# Patient Record
Sex: FEMALE | Race: BLACK OR AFRICAN AMERICAN | NOT HISPANIC OR LATINO | Employment: FULL TIME | URBAN - METROPOLITAN AREA
[De-identification: names, ages, dates, MRNs, and addresses within clinical notes are randomized per-mention and may not be internally consistent; named-entity substitution may affect disease eponyms.]

---

## 2023-07-01 LAB — SARS-COV-2 RESULT: NOT DETECTED

## 2024-03-16 ENCOUNTER — APPOINTMENT (OUTPATIENT)
Dept: RADIOLOGY | Facility: HOSPITAL | Age: 34
End: 2024-03-16
Payer: COMMERCIAL

## 2024-03-16 ENCOUNTER — HOSPITAL ENCOUNTER (EMERGENCY)
Facility: HOSPITAL | Age: 34
Discharge: HOME | End: 2024-03-16
Attending: STUDENT IN AN ORGANIZED HEALTH CARE EDUCATION/TRAINING PROGRAM
Payer: COMMERCIAL

## 2024-03-16 VITALS
RESPIRATION RATE: 19 BRPM | BODY MASS INDEX: 40.2 KG/M2 | DIASTOLIC BLOOD PRESSURE: 69 MMHG | HEART RATE: 76 BPM | TEMPERATURE: 98.4 F | OXYGEN SATURATION: 98 % | WEIGHT: 226.85 LBS | SYSTOLIC BLOOD PRESSURE: 126 MMHG | HEIGHT: 63 IN

## 2024-03-16 DIAGNOSIS — O46.90 VAGINAL BLEEDING DURING PREGNANCY (HHS-HCC): ICD-10-CM

## 2024-03-16 DIAGNOSIS — N30.01 ACUTE CYSTITIS WITH HEMATURIA: Primary | ICD-10-CM

## 2024-03-16 DIAGNOSIS — Z34.90 INTRAUTERINE PREGNANCY (HHS-HCC): ICD-10-CM

## 2024-03-16 LAB
ABO GROUP (TYPE) IN BLOOD: NORMAL
ANION GAP SERPL CALC-SCNC: 11 MMOL/L
APPEARANCE UR: ABNORMAL
BACTERIA #/AREA URNS AUTO: ABNORMAL /HPF
BASOPHILS # BLD AUTO: 0.02 X10*3/UL (ref 0–0.1)
BASOPHILS NFR BLD AUTO: 0.2 %
BILIRUB UR STRIP.AUTO-MCNC: NEGATIVE MG/DL
BUN SERPL-MCNC: 9 MG/DL (ref 8–25)
CALCIUM SERPL-MCNC: 8.8 MG/DL (ref 8.5–10.4)
CHLORIDE SERPL-SCNC: 102 MMOL/L (ref 97–107)
CO2 SERPL-SCNC: 22 MMOL/L (ref 24–31)
COLOR UR: YELLOW
CREAT SERPL-MCNC: 0.8 MG/DL (ref 0.4–1.6)
EGFRCR SERPLBLD CKD-EPI 2021: >90 ML/MIN/1.73M*2
EOSINOPHIL # BLD AUTO: 0.1 X10*3/UL (ref 0–0.7)
EOSINOPHIL NFR BLD AUTO: 1.2 %
ERYTHROCYTE [DISTWIDTH] IN BLOOD BY AUTOMATED COUNT: 14.7 % (ref 11.5–14.5)
GLUCOSE SERPL-MCNC: 105 MG/DL (ref 65–99)
GLUCOSE UR STRIP.AUTO-MCNC: NORMAL MG/DL
HCG SERPL-ACNC: NORMAL MIU/ML
HCG UR QL IA.RAPID: POSITIVE
HCT VFR BLD AUTO: 36.2 % (ref 36–46)
HGB BLD-MCNC: 11.9 G/DL (ref 12–16)
IMM GRANULOCYTES # BLD AUTO: 0.02 X10*3/UL (ref 0–0.7)
IMM GRANULOCYTES NFR BLD AUTO: 0.2 % (ref 0–0.9)
KETONES UR STRIP.AUTO-MCNC: NEGATIVE MG/DL
LEUKOCYTE ESTERASE UR QL STRIP.AUTO: ABNORMAL
LYMPHOCYTES # BLD AUTO: 2.18 X10*3/UL (ref 1.2–4.8)
LYMPHOCYTES NFR BLD AUTO: 26.5 %
MCH RBC QN AUTO: 26.6 PG (ref 26–34)
MCHC RBC AUTO-ENTMCNC: 32.9 G/DL (ref 32–36)
MCV RBC AUTO: 81 FL (ref 80–100)
MONOCYTES # BLD AUTO: 0.49 X10*3/UL (ref 0.1–1)
MONOCYTES NFR BLD AUTO: 6 %
MUCOUS THREADS #/AREA URNS AUTO: ABNORMAL /LPF
NEUTROPHILS # BLD AUTO: 5.42 X10*3/UL (ref 1.2–7.7)
NEUTROPHILS NFR BLD AUTO: 65.9 %
NITRITE UR QL STRIP.AUTO: NEGATIVE
NRBC BLD-RTO: 0 /100 WBCS (ref 0–0)
PH UR STRIP.AUTO: 6 [PH]
PLATELET # BLD AUTO: 255 X10*3/UL (ref 150–450)
POTASSIUM SERPL-SCNC: 3.8 MMOL/L (ref 3.4–5.1)
PROT UR STRIP.AUTO-MCNC: ABNORMAL MG/DL
RBC # BLD AUTO: 4.47 X10*6/UL (ref 4–5.2)
RBC # UR STRIP.AUTO: ABNORMAL /UL
RBC #/AREA URNS AUTO: >20 /HPF
RH FACTOR (ANTIGEN D): NORMAL
SODIUM SERPL-SCNC: 135 MMOL/L (ref 133–145)
SP GR UR STRIP.AUTO: 1.02
SQUAMOUS #/AREA URNS AUTO: ABNORMAL /HPF
UROBILINOGEN UR STRIP.AUTO-MCNC: NORMAL MG/DL
WBC # BLD AUTO: 8.2 X10*3/UL (ref 4.4–11.3)
WBC #/AREA URNS AUTO: ABNORMAL /HPF

## 2024-03-16 PROCEDURE — 36415 COLL VENOUS BLD VENIPUNCTURE: CPT

## 2024-03-16 PROCEDURE — 76801 OB US < 14 WKS SINGLE FETUS: CPT

## 2024-03-16 PROCEDURE — 81025 URINE PREGNANCY TEST: CPT

## 2024-03-16 PROCEDURE — 99284 EMERGENCY DEPT VISIT MOD MDM: CPT | Mod: 25

## 2024-03-16 PROCEDURE — 76817 TRANSVAGINAL US OBSTETRIC: CPT | Performed by: RADIOLOGY

## 2024-03-16 PROCEDURE — 86901 BLOOD TYPING SEROLOGIC RH(D): CPT

## 2024-03-16 PROCEDURE — 87086 URINE CULTURE/COLONY COUNT: CPT | Mod: TRILAB,WESLAB

## 2024-03-16 PROCEDURE — 81003 URINALYSIS AUTO W/O SCOPE: CPT

## 2024-03-16 PROCEDURE — 80048 BASIC METABOLIC PNL TOTAL CA: CPT

## 2024-03-16 PROCEDURE — 76815 OB US LIMITED FETUS(S): CPT | Performed by: RADIOLOGY

## 2024-03-16 PROCEDURE — 85025 COMPLETE CBC W/AUTO DIFF WBC: CPT

## 2024-03-16 PROCEDURE — 76815 OB US LIMITED FETUS(S): CPT

## 2024-03-16 PROCEDURE — 2500000001 HC RX 250 WO HCPCS SELF ADMINISTERED DRUGS (ALT 637 FOR MEDICARE OP)

## 2024-03-16 PROCEDURE — 84702 CHORIONIC GONADOTROPIN TEST: CPT

## 2024-03-16 RX ORDER — PANTOPRAZOLE SODIUM 40 MG/10ML
40 INJECTION, POWDER, LYOPHILIZED, FOR SOLUTION INTRAVENOUS ONCE
Status: DISCONTINUED | OUTPATIENT
Start: 2024-03-16 | End: 2024-03-16

## 2024-03-16 RX ORDER — AMOXICILLIN AND CLAVULANATE POTASSIUM 875; 125 MG/1; MG/1
875 TABLET, FILM COATED ORAL 2 TIMES DAILY
Qty: 14 TABLET | Refills: 0 | Status: SHIPPED | OUTPATIENT
Start: 2024-03-16 | End: 2024-03-27 | Stop reason: ALTCHOICE

## 2024-03-16 RX ORDER — AMOXICILLIN AND CLAVULANATE POTASSIUM 875; 125 MG/1; MG/1
1 TABLET, FILM COATED ORAL ONCE
Status: COMPLETED | OUTPATIENT
Start: 2024-03-16 | End: 2024-03-16

## 2024-03-16 RX ADMIN — AMOXICILLIN AND CLAVULANATE POTASSIUM 1 TABLET: 875; 125 TABLET, FILM COATED ORAL at 13:33

## 2024-03-16 ASSESSMENT — PAIN DESCRIPTION - DESCRIPTORS: DESCRIPTORS: CRAMPING

## 2024-03-16 ASSESSMENT — PAIN SCALES - GENERAL
PAINLEVEL_OUTOF10: 3

## 2024-03-16 ASSESSMENT — COLUMBIA-SUICIDE SEVERITY RATING SCALE - C-SSRS
2. HAVE YOU ACTUALLY HAD ANY THOUGHTS OF KILLING YOURSELF?: NO
1. IN THE PAST MONTH, HAVE YOU WISHED YOU WERE DEAD OR WISHED YOU COULD GO TO SLEEP AND NOT WAKE UP?: NO
6. HAVE YOU EVER DONE ANYTHING, STARTED TO DO ANYTHING, OR PREPARED TO DO ANYTHING TO END YOUR LIFE?: NO

## 2024-03-16 ASSESSMENT — PAIN DESCRIPTION - PAIN TYPE
TYPE: ACUTE PAIN
TYPE: ACUTE PAIN

## 2024-03-16 ASSESSMENT — PAIN DESCRIPTION - LOCATION
LOCATION: ABDOMEN
LOCATION: ABDOMEN

## 2024-03-16 ASSESSMENT — PAIN - FUNCTIONAL ASSESSMENT
PAIN_FUNCTIONAL_ASSESSMENT: 0-10
PAIN_FUNCTIONAL_ASSESSMENT: 0-10

## 2024-03-16 ASSESSMENT — PAIN DESCRIPTION - FREQUENCY: FREQUENCY: INTERMITTENT

## 2024-03-16 ASSESSMENT — PAIN DESCRIPTION - PROGRESSION: CLINICAL_PROGRESSION: NOT CHANGED

## 2024-03-16 ASSESSMENT — PAIN DESCRIPTION - ONSET: ONSET: AWAKENED FROM SLEEP

## 2024-03-16 NOTE — ED PROVIDER NOTES
Patient was seen by both myself and advanced practitioner.  I personally saw the patient and made/approved the management plan and take responsibility for the patient management     Patient is a 34-year-old female  who is 8 weeks gestation that presents emergency room for evaluation of vaginal bleeding, some mild lower abdominal cramping.  Patient states that she has noticed a small amount of red blood when she wipes herself.  She denies any large amount of bleeding, blood clots or passing any material.  She denies fever, chills, nausea, vomiting, chest pain or shortness of breath.  Patient states that she recently found out that she was pregnant however does not have an OB appointment for the next several weeks and has not had an ultrasound to confirm intrauterine pregnancy at this time.    On exam patient resting comfortably in no obvious distress.  He is awake, alert and oriented.  Abdomen soft, nontender, nondistended with no rigidity or guarding.  Vital signs are stable on arrival.  Blood work ordered including CBC, CMP, type and screen, urinalysis, urine pregnancy and serum hCG.  Pregnancy ultrasound was performed to rule out ectopic pregnancy and confirm intrauterine pregnancy.  Blood work was remarkable for significantly elevated hCG consistent with patient's known gestational age of roughly 8 weeks.  Blood work otherwise unremarkable including normal electrolytes, kidney function.  Urinalysis does show 2+ bacteria and patient will be treated for asymptomatic bacteriuria in pregnancy with Augmentin.  Ultrasound does show a monochorionic monoamniotic twin pregnancy. The advanced practitioner did discuss case with the OB/GYN on-call who does feel after reviewing images that patient is safe to be discharged at this time.  Patient will follow-up with her OB/GYN and be referred to high risk pregnancy given the unusual pregnancy on ultrasound.  I did discuss with patient that the vaginal bleeding that she has  is considered a threatened miscarriage and that only time will be able to tell whether she will go on to have a normal pregnancy versus possible miscarriage.  I did recommend pelvic rest at this time and stressed the importance of following up with her OB/GYN.  Patient voices understanding.  She has remained hemodynamically stable.  She was discharged on p.o. Augmentin.  Patient is Rh+ and does not require RhoGAM at this time.  Return precautions were discussed with patient.     Jimmie Salas, DO  03/16/24 1506

## 2024-03-16 NOTE — ED PROVIDER NOTES
HPI   Chief Complaint   Patient presents with    Vaginal Bleeding - Pregnant     Last night I felt dizzy and this morning I had some vaginal bleeding I have some abd cramping and my last period was 2024 nausea       HPI  Patient is a 34-year-old female A0 who is 8 weeks pregnant presents to ED for vaginal bleeding that she noticed this morning when she woke up, complains of associated pelvic cramping.  Patient reports scant amount of bleeding when wiping herself, denies passing clots, soaking pads.  She denies fever or chills, headache, chest pain or shortness of breath, upper abdominal pain or NVD, urinary symptoms.                  Pio Coma Scale Score: 15                     Patient History   No past medical history on file.  No past surgical history on file.  No family history on file.  Social History     Tobacco Use    Smoking status: Not on file    Smokeless tobacco: Not on file   Substance Use Topics    Alcohol use: Not on file    Drug use: Not on file       Physical Exam   ED Triage Vitals [24 1207]   Temperature Heart Rate Respirations BP   36.7 °C (98.1 °F) 83 18 137/61      Pulse Ox Temp Source Heart Rate Source Patient Position   97 % Oral Monitor Sitting      BP Location FiO2 (%)     Left arm --       Physical Exam  Vitals reviewed.   Constitutional:       Appearance: Normal appearance.   Eyes:      Extraocular Movements: Extraocular movements intact.   Cardiovascular:      Rate and Rhythm: Normal rate and regular rhythm.   Pulmonary:      Effort: Pulmonary effort is normal.      Breath sounds: Normal breath sounds.   Abdominal:      General: Abdomen is flat.      Palpations: Abdomen is soft.   Musculoskeletal:         General: Normal range of motion.      Cervical back: Neck supple.   Skin:     General: Skin is dry.   Neurological:      General: No focal deficit present.      Mental Status: She is alert and oriented to person, place, and time.   Psychiatric:         Mood and Affect:  Mood normal.         Behavior: Behavior normal.         ED Course & MDM   Diagnoses as of 03/16/24 1444   Acute cystitis with hematuria   Vaginal bleeding during pregnancy   Intrauterine pregnancy       Medical Decision Making  Parts of this chart have been completed using voice recognition software. Please excuse any errors of transcription.  My thought process and reason for plan has been formulated from the time that I saw the patient until the time of disposition and is not specific to one specific moment during their visit and furthermore my MDM encompasses this entire chart and not only this text box.    HPI:   Detailed above.    Exam:   A medically appropriate exam performed, outlined above, given the known history and presentation.    History obtained from:   Patient    EKG:       Social Determinants of Health considered during this visit:   Employment status    Medications given during visit:  Medications   amoxicillin-pot clavulanate (Augmentin) 875-125 mg per tablet 1 tablet (1 tablet oral Given 3/16/24 1333)        Diagnostic/tests:  Labs Reviewed   HCG, URINE, QUALITATIVE - Abnormal       Result Value    HCG, Urine POSITIVE (*)    CBC WITH AUTO DIFFERENTIAL - Abnormal    WBC 8.2      nRBC 0.0      RBC 4.47      Hemoglobin 11.9 (*)     Hematocrit 36.2      MCV 81      MCH 26.6      MCHC 32.9      RDW 14.7 (*)     Platelets 255      Neutrophils % 65.9      Immature Granulocytes %, Automated 0.2      Lymphocytes % 26.5      Monocytes % 6.0      Eosinophils % 1.2      Basophils % 0.2      Neutrophils Absolute 5.42      Immature Granulocytes Absolute, Automated 0.02      Lymphocytes Absolute 2.18      Monocytes Absolute 0.49      Eosinophils Absolute 0.10      Basophils Absolute 0.02     BASIC METABOLIC PANEL - Abnormal    Glucose 105 (*)     Sodium 135      Potassium 3.8      Chloride 102      Bicarbonate 22 (*)     Urea Nitrogen 9      Creatinine 0.80      eGFR >90      Calcium 8.8      Anion Gap 11      URINALYSIS WITH REFLEX CULTURE AND MICROSCOPIC - Abnormal    Color, Urine Yellow      Appearance, Urine Turbid (*)     Specific Gravity, Urine 1.022      pH, Urine 6.0      Protein, Urine 30 (1+) (*)     Glucose, Urine Normal      Blood, Urine OVER (3+) (*)     Ketones, Urine NEGATIVE      Bilirubin, Urine NEGATIVE      Urobilinogen, Urine Normal      Nitrite, Urine NEGATIVE      Leukocyte Esterase, Urine 250 Pat/µL (*)    MICROSCOPIC ONLY, URINE - Abnormal    WBC, Urine 21-50 (*)     RBC, Urine >20 (*)     Squamous Epithelial Cells, Urine 1-9 (SPARSE)      Bacteria, Urine 2+ (*)     Mucus, Urine FEW     URINE CULTURE   URINALYSIS WITH REFLEX CULTURE AND MICROSCOPIC    Narrative:     The following orders were created for panel order Urinalysis with Reflex Culture and Microscopic.  Procedure                               Abnormality         Status                     ---------                               -----------         ------                     Urinalysis with Reflex C...[[id number]]  Abnormal            Final result               Extra Urine Gray Tube[[id number]]                                                         Please view results for these tests on the individual orders.   HUMAN CHORIONIC GONADOTROPIN, SERUM QUANTITATIVE    HCG, Beta-Quantitative 44,439     ABORH    ABO TYPE A      Rh TYPE POS        US PELVIS OB TRANSABDOMINAL W TRANSVAGINAL UP TO 1ST TRIMESTER   Final Result   1. There is a single gestational sac with a single yolk sac however   there are 2 adjacent fetal poles with fetal cardiac activity in each   of the fetal poles. Findings are most likely due to monochorionic   monoamniotic twin pregnancy. Perinatology consultation is recommended   due to this unusual pregnancy.   2. Findings were discussed with the healthcare provider by telephone   at 1:41 p.m. on 03/16/2024        MACRO:   None        Signed by: Jacquelyn Dillon 3/16/2024 1:41 PM   Dictation workstation:   [id number]      US OB  limited 1+ fetuses    (Results Pending)        Differential Diagnosis:       Consultations:  OB/GYN    Procedures:      Critical Care:      Referrals:  OB/GYN    Discharge Prescriptions:  Augmentin twice daily x 7 days    MDM Summary:  Ultrasound of pelvis shows monoamniotic twin pregnancy.  I discussed case with OB/GYN , recommendations are to have patient follow-up outpatient with her, return precautions were discussed.  Patient has UTI, lab workup is otherwise normal.  hCG is positive, 44,439.  We have discussed the diagnosis and risks, and we agree with discharging home to follow-up with appropriate physician as directed. We also discussed returning to the Emergency Department immediately if new or worsening symptoms occur. We have discussed the symptoms which are most concerning that necessitate immediate return. Pt symptoms have been well controlled here and the patient is safe for discharge with appropriate outpatient follow up. The patient has verbalized understanding to return to ER without delay for new or worsening pains or for any other symptoms or concerns.    I utilized the discharge clinical management tool provided [employer] to help estimate risk of negative outcome for this patient.          Procedure  Procedures     Ananda Meyers PA-C  03/16/24 150

## 2024-03-16 NOTE — Clinical Note
Hugo Kauffman was seen and treated in our emergency department on 3/16/2024.  She may return to work on 03/18/2024.  Patient should be made to light duty x 1 month     If you have any questions or concerns, please don't hesitate to call.      Ananda Meyers PA-C

## 2024-03-18 LAB — BACTERIA UR CULT: NORMAL

## 2024-03-25 ENCOUNTER — TELEPHONE (OUTPATIENT)
Dept: OBSTETRICS AND GYNECOLOGY | Facility: CLINIC | Age: 34
End: 2024-03-25
Payer: COMMERCIAL

## 2024-03-25 NOTE — TELEPHONE ENCOUNTER
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

## 2024-03-27 ENCOUNTER — PREP FOR PROCEDURE (OUTPATIENT)
Dept: OBSTETRICS AND GYNECOLOGY | Facility: CLINIC | Age: 34
End: 2024-03-27

## 2024-03-27 ENCOUNTER — PATIENT MESSAGE (OUTPATIENT)
Dept: OBSTETRICS AND GYNECOLOGY | Facility: CLINIC | Age: 34
End: 2024-03-27

## 2024-03-27 ENCOUNTER — HOSPITAL ENCOUNTER (OUTPATIENT)
Dept: RADIOLOGY | Facility: CLINIC | Age: 34
Discharge: HOME | End: 2024-03-27
Payer: COMMERCIAL

## 2024-03-27 ENCOUNTER — OFFICE VISIT (OUTPATIENT)
Dept: OBSTETRICS AND GYNECOLOGY | Facility: CLINIC | Age: 34
End: 2024-03-27
Payer: COMMERCIAL

## 2024-03-27 VITALS — BODY MASS INDEX: 39.5 KG/M2 | WEIGHT: 223 LBS | SYSTOLIC BLOOD PRESSURE: 120 MMHG | DIASTOLIC BLOOD PRESSURE: 70 MMHG

## 2024-03-27 DIAGNOSIS — O30.011 MONOAMNIOTIC AND MONOCHORIONIC TWIN GESTATION IN FIRST TRIMESTER (HHS-HCC): ICD-10-CM

## 2024-03-27 DIAGNOSIS — O20.0 THREATENED ABORTION (HHS-HCC): ICD-10-CM

## 2024-03-27 DIAGNOSIS — O30.011 MONOAMNIOTIC AND MONOCHORIONIC TWIN GESTATION IN FIRST TRIMESTER (HHS-HCC): Primary | ICD-10-CM

## 2024-03-27 DIAGNOSIS — O02.1 MISSED ABORTION (HHS-HCC): ICD-10-CM

## 2024-03-27 DIAGNOSIS — O02.1 MISSED ABORTION (HHS-HCC): Primary | ICD-10-CM

## 2024-03-27 LAB — PREGNANCY TEST URINE, POC: POSITIVE

## 2024-03-27 PROCEDURE — 87661 TRICHOMONAS VAGINALIS AMPLIF: CPT

## 2024-03-27 PROCEDURE — 88175 CYTOPATH C/V AUTO FLUID REDO: CPT

## 2024-03-27 PROCEDURE — 81025 URINE PREGNANCY TEST: CPT | Performed by: OBSTETRICS & GYNECOLOGY

## 2024-03-27 PROCEDURE — 99204 OFFICE O/P NEW MOD 45 MIN: CPT | Performed by: OBSTETRICS & GYNECOLOGY

## 2024-03-27 PROCEDURE — 87624 HPV HI-RISK TYP POOLED RSLT: CPT

## 2024-03-27 PROCEDURE — 87086 URINE CULTURE/COLONY COUNT: CPT

## 2024-03-27 PROCEDURE — 76802 OB US < 14 WKS ADDL FETUS: CPT | Performed by: OBSTETRICS & GYNECOLOGY

## 2024-03-27 PROCEDURE — 87800 DETECT AGNT MULT DNA DIREC: CPT

## 2024-03-27 PROCEDURE — 76801 OB US < 14 WKS SINGLE FETUS: CPT | Performed by: OBSTETRICS & GYNECOLOGY

## 2024-03-27 RX ORDER — GABAPENTIN 600 MG/1
600 TABLET ORAL ONCE
Status: CANCELLED | OUTPATIENT
Start: 2024-03-27 | End: 2024-03-27

## 2024-03-27 RX ORDER — ACETAMINOPHEN 325 MG/1
975 TABLET ORAL ONCE
Status: CANCELLED | OUTPATIENT
Start: 2024-03-27 | End: 2024-03-27

## 2024-03-27 RX ORDER — CELECOXIB 400 MG/1
400 CAPSULE ORAL ONCE
Status: CANCELLED | OUTPATIENT
Start: 2024-03-27 | End: 2024-03-27

## 2024-03-27 NOTE — PROGRESS NOTES
Patient is seen in follow-up from the emergency room.  Patient is a G4, P3 with 3 prior vaginal deliveries.  Last menstrual period was January 19 and she presented to the emergency room with spotting.  Ultrasound revealed a twin mono/mono gestation, possibly conjoined.  Pregnancy was viable.  Blood type a positive.    Current medications are prenatal vitamins, folic acid, iron and Tylenol     past medical history is noncontributory.    REVIEW OF SYSTEMS    Please see HPI for reported pertinent positives, which would supersede this ROS    Constitutional:  Denies fever, chills, wt gain or loss, fatigue    Genito-Urinary:  Denies genital lesion or sores, vaginal dryness, itching  or pain.  No abnormal vaginal discharge or unexplained vaginal bleeding.  No dysuria, urinary incontinence or frequency.  Denies pelvic pain, dysmenorrhea or dyspareunia.    Eyes:  Denies vision changes, dryness  ENT:  No hearing loss, sinus pain or congestion, nosebleeds  Cardiovascular:  No chest pain or palpitations  Respiratory:  No SOB, cough, wheezing  GI:  No Nausea, vomiting, diarrhea, constipation, abdominal pain  Musculoskeletal:  No new back pain. joint pain, peripheral edema  Skin:  No rash or skin lesion  Neurologic:  No HA, numbness or dizziness  Psychiatric:  No new anxiety or depression  Endocrine:  No loss of hair or hirsutism  Hematologic/lymphatic:  No swollen lymph nodes.  No reported tendency for easy bruising or bleeding    Patient admits to no other systemic complaints      Vitals:    03/27/24 1400   BP: 120/70       PHYSICAL EXAM      PSYCH:  Pt is alert, oriented and cooperative    SKIN: warm, dry, w/o lesion    HEAD AND FACE:  External inspection of eyes, ears, functional cranial nerves normal and intact    THYROID:  No thyromegaly    CARDIOVASCULAR:  Warm and well Perfused    PULMONARY:  No respiratory distress    ABDOMEN:  soft, nontender.  No mass or organomegaly.      PELVIC:    External genitalia, urethra,  perianal region normal to inspection and palpation if indicated.  No inguinal LA    Vagina without lesions.    Cervix seen and visually normal  Pap and cervical cultures done.    Bimanual exam:      No pelvic mass palpable    Uterus nontender, midline in pelvis    No adnexal masses or tenderness    Assessment/Plan    Diagnoses and all orders for this visit:  Monoamniotic and monochorionic twin gestation in first trimester  -     Referral to Maternal Fetal Medicine; Future  -     US MAC OB imaging order; Future  -     THINPREP PAP TEST  -     US OB < 14 weeks early; Future  -     Trichomonas vaginalis, Nucleic Acid Detection  -     C. Trachomatis / N. Gonorrhoeae, Amplified Detection  Threatened  - -> Missed ab by usn today  -     POCT pregnancy, urine manually resulted  -     Urine Culture  -     C. Trachomatis / N. Gonorrhoeae, Amplified Detection  -     HIV 1/2 Antigen/Antibody Screen with Reflex to Confirmation; Future  -     CBC; Future  -     Hepatitis B Surface Antigen; Future  -     Hepatitis C Antibody; Future  -     Rubella Antibody, IgG; Future  -     Varicella Zoster Antibody, IgG; Future  -     Syphilis Screen with Reflex; Future  -     Type And Screen; Future  Addendum: Ultrasound today demonstrates a 9-week twin missed AB.  No fetal heartbeat detected in either fetus.  Discussed management options of expectant management, medical management or D&C.  Patient strongly prefers to have a D&C.  Currently no bleeding.  Will assess availability for scheduling.

## 2024-03-28 LAB
BACTERIA UR CULT: NORMAL
C TRACH RRNA SPEC QL NAA+PROBE: NEGATIVE
C TRACH RRNA SPEC QL NAA+PROBE: NEGATIVE
N GONORRHOEA DNA SPEC QL PROBE+SIG AMP: NEGATIVE
N GONORRHOEA DNA SPEC QL PROBE+SIG AMP: NEGATIVE
T VAGINALIS RRNA SPEC QL NAA+PROBE: NEGATIVE

## 2024-03-29 ENCOUNTER — HOSPITAL ENCOUNTER (OUTPATIENT)
Facility: HOSPITAL | Age: 34
Setting detail: OBSERVATION
LOS: 1 days | Discharge: HOME | End: 2024-03-29
Attending: OBSTETRICS & GYNECOLOGY | Admitting: OBSTETRICS & GYNECOLOGY
Payer: COMMERCIAL

## 2024-03-29 ENCOUNTER — ANESTHESIA EVENT (OUTPATIENT)
Dept: OBSTETRICS AND GYNECOLOGY | Facility: HOSPITAL | Age: 34
End: 2024-03-29
Payer: COMMERCIAL

## 2024-03-29 ENCOUNTER — ANESTHESIA (OUTPATIENT)
Dept: OBSTETRICS AND GYNECOLOGY | Facility: HOSPITAL | Age: 34
End: 2024-03-29
Payer: COMMERCIAL

## 2024-03-29 ENCOUNTER — HOSPITAL ENCOUNTER (OUTPATIENT)
Facility: HOSPITAL | Age: 34
Discharge: HOME | End: 2024-03-29
Attending: OBSTETRICS & GYNECOLOGY | Admitting: OBSTETRICS & GYNECOLOGY
Payer: COMMERCIAL

## 2024-03-29 VITALS
SYSTOLIC BLOOD PRESSURE: 121 MMHG | TEMPERATURE: 97.3 F | WEIGHT: 224.87 LBS | DIASTOLIC BLOOD PRESSURE: 58 MMHG | OXYGEN SATURATION: 98 % | HEART RATE: 68 BPM | RESPIRATION RATE: 18 BRPM | BODY MASS INDEX: 39.84 KG/M2 | HEIGHT: 63 IN

## 2024-03-29 VITALS — OXYGEN SATURATION: 97 % | HEART RATE: 72 BPM | DIASTOLIC BLOOD PRESSURE: 55 MMHG | SYSTOLIC BLOOD PRESSURE: 119 MMHG

## 2024-03-29 DIAGNOSIS — O02.1 MISSED ABORTION (HHS-HCC): Primary | ICD-10-CM

## 2024-03-29 LAB
ABO GROUP (TYPE) IN BLOOD: NORMAL
ANTIBODY SCREEN: NORMAL
RH FACTOR (ANTIGEN D): NORMAL

## 2024-03-29 PROCEDURE — 59812 TREATMENT OF MISCARRIAGE: CPT | Performed by: OBSTETRICS & GYNECOLOGY

## 2024-03-29 PROCEDURE — 2500000005 HC RX 250 GENERAL PHARMACY W/O HCPCS: Performed by: NURSE ANESTHETIST, CERTIFIED REGISTERED

## 2024-03-29 PROCEDURE — 86901 BLOOD TYPING SEROLOGIC RH(D): CPT | Performed by: OBSTETRICS & GYNECOLOGY

## 2024-03-29 PROCEDURE — A59820 PR SURG RX MISSED ABORTN,1ST TRI: Performed by: NURSE ANESTHETIST, CERTIFIED REGISTERED

## 2024-03-29 PROCEDURE — 3700000018 HC OB ANESTHESIA C-SECTION: Performed by: OBSTETRICS & GYNECOLOGY

## 2024-03-29 PROCEDURE — 88305 TISSUE EXAM BY PATHOLOGIST: CPT | Mod: TC,AHULAB | Performed by: OBSTETRICS & GYNECOLOGY

## 2024-03-29 PROCEDURE — 2500000004 HC RX 250 GENERAL PHARMACY W/ HCPCS (ALT 636 FOR OP/ED): Performed by: NURSE ANESTHETIST, CERTIFIED REGISTERED

## 2024-03-29 PROCEDURE — 36415 COLL VENOUS BLD VENIPUNCTURE: CPT | Performed by: OBSTETRICS & GYNECOLOGY

## 2024-03-29 PROCEDURE — 1210000001 HC SEMI-PRIVATE ROOM DAILY

## 2024-03-29 PROCEDURE — G0378 HOSPITAL OBSERVATION PER HR: HCPCS

## 2024-03-29 PROCEDURE — 59820 CARE OF MISCARRIAGE: CPT | Performed by: OBSTETRICS & GYNECOLOGY

## 2024-03-29 PROCEDURE — 7100000016 HC LABOR RECOVERY PER HOUR: Performed by: OBSTETRICS & GYNECOLOGY

## 2024-03-29 PROCEDURE — 88305 TISSUE EXAM BY PATHOLOGIST: CPT | Performed by: PATHOLOGY

## 2024-03-29 RX ORDER — ONDANSETRON HYDROCHLORIDE 2 MG/ML
INJECTION, SOLUTION INTRAVENOUS AS NEEDED
Status: DISCONTINUED | OUTPATIENT
Start: 2024-03-29 | End: 2024-03-29

## 2024-03-29 RX ORDER — CELECOXIB 200 MG/1
400 CAPSULE ORAL ONCE
Status: DISCONTINUED | OUTPATIENT
Start: 2024-03-29 | End: 2024-03-29 | Stop reason: HOSPADM

## 2024-03-29 RX ORDER — DEXMEDETOMIDINE IN 0.9 % NACL 20 MCG/5ML
SYRINGE (ML) INTRAVENOUS AS NEEDED
Status: DISCONTINUED | OUTPATIENT
Start: 2024-03-29 | End: 2024-03-29

## 2024-03-29 RX ORDER — GABAPENTIN 100 MG/1
600 CAPSULE ORAL ONCE
Status: DISCONTINUED | OUTPATIENT
Start: 2024-03-29 | End: 2024-03-29 | Stop reason: HOSPADM

## 2024-03-29 RX ORDER — PROPOFOL 10 MG/ML
INJECTION, EMULSION INTRAVENOUS AS NEEDED
Status: DISCONTINUED | OUTPATIENT
Start: 2024-03-29 | End: 2024-03-29

## 2024-03-29 RX ORDER — MIDAZOLAM HYDROCHLORIDE 1 MG/ML
INJECTION, SOLUTION INTRAMUSCULAR; INTRAVENOUS AS NEEDED
Status: DISCONTINUED | OUTPATIENT
Start: 2024-03-29 | End: 2024-03-29

## 2024-03-29 RX ORDER — KETOROLAC TROMETHAMINE 30 MG/ML
INJECTION, SOLUTION INTRAMUSCULAR; INTRAVENOUS AS NEEDED
Status: DISCONTINUED | OUTPATIENT
Start: 2024-03-29 | End: 2024-03-29

## 2024-03-29 RX ORDER — FENTANYL CITRATE 50 UG/ML
INJECTION, SOLUTION INTRAMUSCULAR; INTRAVENOUS AS NEEDED
Status: DISCONTINUED | OUTPATIENT
Start: 2024-03-29 | End: 2024-03-29

## 2024-03-29 RX ORDER — ACETAMINOPHEN 325 MG/1
975 TABLET ORAL ONCE
Status: DISCONTINUED | OUTPATIENT
Start: 2024-03-29 | End: 2024-03-29 | Stop reason: HOSPADM

## 2024-03-29 RX ORDER — FLUMAZENIL 0.1 MG/ML
INJECTION INTRAVENOUS AS NEEDED
Status: DISCONTINUED | OUTPATIENT
Start: 2024-03-29 | End: 2024-03-29

## 2024-03-29 RX ADMIN — MIDAZOLAM HYDROCHLORIDE 2 MG: 1 INJECTION, SOLUTION INTRAMUSCULAR; INTRAVENOUS at 09:48

## 2024-03-29 RX ADMIN — SODIUM CHLORIDE, SODIUM LACTATE, POTASSIUM CHLORIDE, AND CALCIUM CHLORIDE: 600; 310; 30; 20 INJECTION, SOLUTION INTRAVENOUS at 09:45

## 2024-03-29 RX ADMIN — PROPOFOL 20 MG: 10 INJECTION, EMULSION INTRAVENOUS at 10:03

## 2024-03-29 RX ADMIN — PROPOFOL 20 MG: 10 INJECTION, EMULSION INTRAVENOUS at 10:13

## 2024-03-29 RX ADMIN — FENTANYL CITRATE 50 MCG: 50 INJECTION, SOLUTION INTRAMUSCULAR; INTRAVENOUS at 09:56

## 2024-03-29 RX ADMIN — FENTANYL CITRATE 50 MCG: 50 INJECTION, SOLUTION INTRAMUSCULAR; INTRAVENOUS at 10:12

## 2024-03-29 RX ADMIN — PROPOFOL 20 MG: 10 INJECTION, EMULSION INTRAVENOUS at 10:02

## 2024-03-29 RX ADMIN — PROPOFOL 50 MG: 10 INJECTION, EMULSION INTRAVENOUS at 09:53

## 2024-03-29 RX ADMIN — DEXAMETHASONE SODIUM PHOSPHATE 4 MG: 4 INJECTION, SOLUTION INTRA-ARTICULAR; INTRALESIONAL; INTRAMUSCULAR; INTRAVENOUS; SOFT TISSUE at 09:59

## 2024-03-29 RX ADMIN — Medication 4 MCG: at 10:12

## 2024-03-29 RX ADMIN — PROPOFOL 50 MG: 10 INJECTION, EMULSION INTRAVENOUS at 09:55

## 2024-03-29 RX ADMIN — PROPOFOL 20 MG: 10 INJECTION, EMULSION INTRAVENOUS at 10:05

## 2024-03-29 RX ADMIN — PROPOFOL 20 MG: 10 INJECTION, EMULSION INTRAVENOUS at 10:12

## 2024-03-29 RX ADMIN — ONDANSETRON 4 MG: 2 INJECTION INTRAMUSCULAR; INTRAVENOUS at 10:00

## 2024-03-29 RX ADMIN — PROPOFOL 20 MG: 10 INJECTION, EMULSION INTRAVENOUS at 10:01

## 2024-03-29 RX ADMIN — PROPOFOL 20 MG: 10 INJECTION, EMULSION INTRAVENOUS at 10:11

## 2024-03-29 RX ADMIN — Medication 8 MCG: at 09:55

## 2024-03-29 RX ADMIN — Medication 8 MCG: at 10:06

## 2024-03-29 RX ADMIN — PROPOFOL 20 MG: 10 INJECTION, EMULSION INTRAVENOUS at 10:04

## 2024-03-29 RX ADMIN — KETOROLAC TROMETHAMINE 30 MG: 30 INJECTION, SOLUTION INTRAMUSCULAR; INTRAVENOUS at 10:00

## 2024-03-29 RX ADMIN — PROPOFOL 20 MG: 10 INJECTION, EMULSION INTRAVENOUS at 10:00

## 2024-03-29 RX ADMIN — PROPOFOL 20 MG: 10 INJECTION, EMULSION INTRAVENOUS at 10:10

## 2024-03-29 RX ADMIN — PROPOFOL 20 MG: 10 INJECTION, EMULSION INTRAVENOUS at 10:08

## 2024-03-29 RX ADMIN — PROPOFOL 20 MG: 10 INJECTION, EMULSION INTRAVENOUS at 10:09

## 2024-03-29 RX ADMIN — FLUMAZENIL 0.2 MG: 0.1 INJECTION, SOLUTION INTRAVENOUS at 11:36

## 2024-03-29 RX ADMIN — PROPOFOL 20 MG: 10 INJECTION, EMULSION INTRAVENOUS at 10:06

## 2024-03-29 RX ADMIN — PROPOFOL 20 MG: 10 INJECTION, EMULSION INTRAVENOUS at 10:07

## 2024-03-29 RX ADMIN — SODIUM CHLORIDE, SODIUM LACTATE, POTASSIUM CHLORIDE, AND CALCIUM CHLORIDE: 600; 310; 30; 20 INJECTION, SOLUTION INTRAVENOUS at 09:48

## 2024-03-29 RX ADMIN — PROPOFOL 20 MG: 10 INJECTION, EMULSION INTRAVENOUS at 09:56

## 2024-03-29 SDOH — SOCIAL STABILITY: SOCIAL INSECURITY: HAVE YOU HAD THOUGHTS OF HARMING ANYONE ELSE?: NO

## 2024-03-29 SDOH — SOCIAL STABILITY: SOCIAL INSECURITY: DOES ANYONE TRY TO KEEP YOU FROM HAVING/CONTACTING OTHER FRIENDS OR DOING THINGS OUTSIDE YOUR HOME?: NO

## 2024-03-29 SDOH — HEALTH STABILITY: MENTAL HEALTH: WISH TO BE DEAD (PAST 1 MONTH): NO

## 2024-03-29 SDOH — SOCIAL STABILITY: SOCIAL INSECURITY: PHYSICAL ABUSE: DENIES

## 2024-03-29 SDOH — ECONOMIC STABILITY: HOUSING INSECURITY: DO YOU FEEL UNSAFE GOING BACK TO THE PLACE WHERE YOU ARE LIVING?: NO

## 2024-03-29 SDOH — SOCIAL STABILITY: SOCIAL INSECURITY: ARE THERE ANY APPARENT SIGNS OF INJURIES/BEHAVIORS THAT COULD BE RELATED TO ABUSE/NEGLECT?: NO

## 2024-03-29 SDOH — HEALTH STABILITY: MENTAL HEALTH: CURRENT SMOKER: 0

## 2024-03-29 SDOH — SOCIAL STABILITY: SOCIAL INSECURITY: ARE YOU OR HAVE YOU BEEN THREATENED OR ABUSED PHYSICALLY, EMOTIONALLY, OR SEXUALLY BY ANYONE?: NO

## 2024-03-29 SDOH — HEALTH STABILITY: MENTAL HEALTH: HAVE YOU USED ANY PRESCRIPTION DRUGS OTHER THAN PRESCRIBED IN THE PAST 12 MONTHS?: NO

## 2024-03-29 SDOH — HEALTH STABILITY: MENTAL HEALTH: WERE YOU ABLE TO COMPLETE ALL THE BEHAVIORAL HEALTH SCREENINGS?: YES

## 2024-03-29 SDOH — HEALTH STABILITY: MENTAL HEALTH: NON-SPECIFIC ACTIVE SUICIDAL THOUGHTS (PAST 1 MONTH): NO

## 2024-03-29 SDOH — SOCIAL STABILITY: SOCIAL INSECURITY: VERBAL ABUSE: DENIES

## 2024-03-29 SDOH — SOCIAL STABILITY: SOCIAL INSECURITY: ABUSE SCREEN: ADULT

## 2024-03-29 SDOH — SOCIAL STABILITY: SOCIAL INSECURITY: DO YOU FEEL ANYONE HAS EXPLOITED OR TAKEN ADVANTAGE OF YOU FINANCIALLY OR OF YOUR PERSONAL PROPERTY?: NO

## 2024-03-29 SDOH — SOCIAL STABILITY: SOCIAL INSECURITY: HAS ANYONE EVER THREATENED TO HURT YOUR FAMILY OR YOUR PETS?: NO

## 2024-03-29 SDOH — HEALTH STABILITY: MENTAL HEALTH: HAVE YOU USED ANY SUBSTANCES (CANABIS, COCAINE, HEROIN, HALLUCINOGENS, INHALANTS, ETC.) IN THE PAST 12 MONTHS?: NO

## 2024-03-29 SDOH — HEALTH STABILITY: MENTAL HEALTH: SUICIDAL BEHAVIOR (LIFETIME): NO

## 2024-03-29 ASSESSMENT — LIFESTYLE VARIABLES
SKIP TO QUESTIONS 9-10: 1
HOW MANY STANDARD DRINKS CONTAINING ALCOHOL DO YOU HAVE ON A TYPICAL DAY: PATIENT DOES NOT DRINK
HOW OFTEN DO YOU HAVE A DRINK CONTAINING ALCOHOL: NEVER
AUDIT-C TOTAL SCORE: 0
HOW OFTEN DO YOU HAVE 6 OR MORE DRINKS ON ONE OCCASION: NEVER
AUDIT-C TOTAL SCORE: 0

## 2024-03-29 ASSESSMENT — PAIN SCALES - GENERAL: PAINLEVEL_OUTOF10: 0 - NO PAIN

## 2024-03-29 ASSESSMENT — PATIENT HEALTH QUESTIONNAIRE - PHQ9
SUM OF ALL RESPONSES TO PHQ9 QUESTIONS 1 & 2: 0
2. FEELING DOWN, DEPRESSED OR HOPELESS: NOT AT ALL
1. LITTLE INTEREST OR PLEASURE IN DOING THINGS: NOT AT ALL

## 2024-03-29 NOTE — OP NOTE
Date: 3/29/2024  OR Location: [facility] OB    Name: Hugo Kauffman, : 1990, Age: 34 y.o., MRN: [id number], Sex: female    Diagnosis  Pre-op Diagnosis     * Missed  [O02.1] Post-op Diagnosis     * Missed  [O02.1]     Procedures  OBGYN D&C Treatment Missed AB  87967 - [facility] TX MISSED  FIRST TRIMESTER SURGICAL      Surgeons      * Gabriel Uribe - Primary    Resident/Fellow/Other Assistant:  Surgeon(s) and Role:     * Abby Escobedo SA - Assisting    Procedure Summary  Anesthesia: General  ASA: III  Anesthesia Staff: CRNA: BRAEDEN Reynoso-CRNA  Estimated Blood Loss: 75ccmL  Intra-op Medications: Administrations occurring from 0748 to 1031 on 24:  * No intraprocedure medications in log *           Anesthesia Record               Intraprocedure I/O Totals          Intake    LR bolus 600.00 mL    Total Intake 600 mL       Output    Urine 100 mL    Total Output 100 mL       Net    Net Volume 500 mL          Specimen:   ID Type Source Tests Collected by Time   1 : products of conception, missed AB at 9 wks, mono mono twins. 1 of 2 tissue samples Tissue PRODUCTS OF CONCEPTION SURGICAL PATHOLOGY EXAM Gabriel Uribe MD 3/29/2024 1011   2 : tissue 2 of 2, products too large to fit into other canister. Products from same D&C procedure Tissue PRODUCTS OF CONCEPTION SURGICAL PATHOLOGY EXAM Gabriel Uribe MD 3/29/2024 1011        Staff:   Circulator: Jeny Gonzalez RN         Procedure Details:  The patient was seen in the preoperative area. The site of surgery was properly noted/marked if necessary per policy. The patient has been actively warmed in preoperative area. Preoperative antibiotics are not indicated. Venous thrombosis prophylaxis have been ordered including bilateral sequential compression devices    The patient was taken to the OR, where after satisfactory patient led timeout identified correct patient, procedure and perioperative concerns, she underwent her  "anesthetic and was placed in the dorsal lithotomy position using low Jesse stirrups.  The cervix, vagina, and perineum were prepped draped usual fashion.    The cervix was exposed and grasped with a Christiansen tenaculum.  The cervix was dilated to a #20 Hanks dilator and then a # 31 Hegar dilator which allowed passage of a # 12 curved suction curette.  Products of conception were next removed with polyp forceps.  As bleeding began, suction curettage was undertaken with return of products of conception.  At the completion of suction curettage, sharp curettage was undertaken in the endometrial lining felt \"gritty\" consistent with entire removal of products of conception.  Anterior cervical tenaculum was removed.        The patient returned to recovery room in stable condition.     Findings: 9 week midposition uterus    Complications:  None; patient tolerated the procedure well.     Disposition: PACU - hemodynamically stable.  Condition: stable  "

## 2024-03-29 NOTE — ANESTHESIA POSTPROCEDURE EVALUATION
Patient: Hugo Kauffman    Procedure Summary       Date: 24 Room / Location: [facility] OB 02 / [facility]    Anesthesia Start: 948 Anesthesia Stop:     Procedure: OBGYN D&C Treatment Missed AB Diagnosis:       Missed       (Missed  [O02.1])    Surgeons: Gabriel Uribe MD Responsible Provider: NIKKY Reynoso    Anesthesia Type: MAC ASA Status: 3            Anesthesia Type: MAC    Vitals Value Taken Time   /62 24 1133   Temp 36.3 °C (97.3 °F) 24 1033   Pulse 65 24 1138   Resp 17 24 1118   SpO2 99 % 24 1138       Anesthesia Post Evaluation    Patient location during evaluation: bedside  Patient participation: complete - patient participated  Level of consciousness: awake and alert  Pain management: adequate  Airway patency: patent  Cardiovascular status: acceptable  Respiratory status: acceptable  Hydration status: acceptable  Postoperative Nausea and Vomiting: none    No notable events documented.

## 2024-03-29 NOTE — ANESTHESIA PREPROCEDURE EVALUATION
Patient: Hugo Kauffman    Evaluation Method: In-person visit    Procedure Information       Date/Time: 03/29/24 1000    Procedure: OBGYN D&C Treatment Missed AB - Twin mono mono missed ab    Location: [facility] OB 02 / [facility]    Surgeons: Gabriel Uribe MD            Relevant Problems   Anesthesia (within normal limits)      Cardiac (within normal limits)      Pulmonary  Hx of snoring      Neuro (within normal limits)      GI (within normal limits)      Liver (within normal limits)      Endocrine (within normal limits)      Hematology (within normal limits)      Musculoskeletal (within normal limits)      HEENT (within normal limits)      ID (within normal limits)      Skin (within normal limits)      GYN  Missed AB twin pregnancy at 9wks       Clinical information reviewed:    Allergies  Meds               NPO Detail:  NPO/Void Status  Date of Last Liquid: 03/28/24  Time of Last Liquid: 2200  Date of Last Solid: 03/28/24  Time of Last Solid: 2200      Vitals:    03/29/24 0819   BP: 119/55   Pulse: 69   Resp: 18   Temp: 36.4 °C (97.5 °F)   SpO2: 98%       No past surgical history on file.  No past medical history on file.    Current Facility-Administered Medications:   •  acetaminophen (Tylenol) tablet 975 mg, 975 mg, oral, Once, Gabriel Uribe MD  •  celecoxib (CeleBREX) capsule 400 mg, 400 mg, oral, Once, Gabriel Uribe MD  •  gabapentin (Neurontin) capsule 600 mg, 600 mg, oral, Once, Gabriel Uribe MD  Prior to Admission medications    Medication Sig Start Date End Date Taking? Authorizing Provider   prenatal vit no.124/iron/folic (PRENATAL VITAMIN ORAL) Take by mouth.    Historical Provider, MD   amoxicillin-pot clavulanate (Augmentin) 875-125 mg tablet Take 1 tablet (875 mg) by mouth 2 times a day for 7 days. 3/16/24 3/27/24  Ananda Meyers PA-C     No Known Allergies  Social History     Tobacco Use   • Smoking status: Never   • Smokeless tobacco: Never   Substance Use Topics   •  "Alcohol use: Not on file         Chemistry    Lab Results   Component Value Date/Time     2024 1226    K 3.8 2024 1226     2024 1226    CO2 22 (L) 2024 1226    BUN 9 2024 1226    CREATININE 0.80 2024 1226    Lab Results   Component Value Date/Time    CALCIUM 8.8 2024 1226    ALKPHOS 45 2023 0255    AST 14 2023 0255    ALT 15 2023 0255    BILITOT 0.2 2023 0255          Lab Results   Component Value Date/Time    WBC 8.2 2024 1226    HGB 11.9 (L) 2024 1226    HCT 36.2 2024 1226     2024 1226     No results found for: \"PROTIME\", \"PTT\", \"INR\"  No results found for this or any previous visit (from the past 4464 hour(s)).  No results found for this or any previous visit from the past 1095 days.       OB/Gyn Evaluation    Present Pregnancy    (+) , missed/incomplete    Obstetric History            Physical Exam    Airway  Mallampati: II  TM distance: >3 FB  Neck ROM: full     Cardiovascular - normal exam     Dental - normal exam     Pulmonary - normal exam     Abdominal        Anesthesia Plan    History of general anesthesia?: yes  History of complications of general anesthesia?: no    ASA 3     MAC   (Discussed use of LMA and converting to general anesthesia if patient not comfortable during procedure)  The patient is not a current smoker.  Patient was not previously instructed to abstain from smoking on day of procedure.  Patient did not smoke on day of procedure.    intravenous induction   Anesthetic plan and risks discussed with patient.    "

## 2024-03-29 NOTE — H&P
Obstetrical Admission History and Physical     Hugo Kauffman is a 34 y.o. . Has mono mono twin missed ab.  Presents for suction D&C  Rh pos    Chief Complaint: No chief complaint on file.    Assessment/Plan    9 week Mono/mono twin demise.  Have disc options of expectant mgmt, medical mgmt and D&C.  Alt/r/b including bleeding, infection, anesthetic risk and uterine perforation discussed.  Pt and  wish to proceed w D&C.      Principal Problem:    Missed       Pregnancy Problems (from 24 to present)       No problems associated with this episode.          Subjective       Presents for f/up from ED at which point was a threatened Ab w viable mono mono twin gestation, possibly conjoined.  USN done 2 d ago in our office - No FHR detected, either fetus     Obstetrical History   OB History    Para Term  AB Living   4 3 3     3   SAB IAB Ectopic Multiple Live Births                  # Outcome Date GA Lbr Quinn/2nd Weight Sex Delivery Anes PTL Lv   4 Current            3 Term            2 Term            1 Term                Past Medical History  No past medical history on file.     Past Surgical History   No past surgical history on file.    Social History  Social History     Tobacco Use    Smoking status: Never    Smokeless tobacco: Never   Substance Use Topics    Alcohol use: Not on file     Substance and Sexual Activity   Drug Use Never       Allergies  Patient has no known allergies.     Medications  No medications prior to admission.       Objective    Last Vitals  Temp Pulse Resp BP MAP O2 Sat                   Physical Examination  GENERAL: Examination reveals a well developed, well nourished, gravid female in no acute distress. She is alert and cooperative.  HEENT: PERRLA. External ears normal. Nose normal, no erythema or discharge. Mouth and throat clear.  LUNGS: clear to auscultation bilaterally  HEART: regular rate and rhythm, S1, S2 normal, no murmur, click, rub or  gallop  ABDOMEN: soft, gravid, nontender, nondistended, no abnormal masses, no epigastric pain  EXTREMITIES: no redness or tenderness in the calves or thighs, no edema  PSYCHOLOGICAL: awake and alert; oriented to person, place, and time    Lab Review  Labs in chart were reviewed.

## 2024-04-02 LAB
LABORATORY COMMENT REPORT: NORMAL
PATH REPORT.COMMENTS IMP SPEC: NORMAL
PATH REPORT.FINAL DX SPEC: NORMAL
PATH REPORT.GROSS SPEC: NORMAL
PATH REPORT.RELEVANT HX SPEC: NORMAL
PATH REPORT.TOTAL CANCER: NORMAL

## 2024-04-04 LAB
CYTOLOGY CMNT CVX/VAG CYTO-IMP: NORMAL
HPV HR 12 DNA GENITAL QL NAA+PROBE: NEGATIVE
HPV HR GENOTYPES PNL CVX NAA+PROBE: NEGATIVE
HPV16 DNA SPEC QL NAA+PROBE: NEGATIVE
HPV18 DNA SPEC QL NAA+PROBE: NEGATIVE
LAB AP HPV GENOTYPE QUESTION: YES
LAB AP HPV HR: NORMAL
LABORATORY COMMENT REPORT: NORMAL
PATH REPORT.TOTAL CANCER: NORMAL

## 2024-04-10 ENCOUNTER — OFFICE VISIT (OUTPATIENT)
Dept: OBSTETRICS AND GYNECOLOGY | Facility: CLINIC | Age: 34
End: 2024-04-10
Payer: COMMERCIAL

## 2024-04-10 DIAGNOSIS — O02.1 MISSED ABORTION (HHS-HCC): ICD-10-CM

## 2024-04-10 PROCEDURE — 1036F TOBACCO NON-USER: CPT | Performed by: OBSTETRICS & GYNECOLOGY

## 2024-04-10 PROCEDURE — 99024 POSTOP FOLLOW-UP VISIT: CPT | Performed by: OBSTETRICS & GYNECOLOGY

## 2024-04-10 NOTE — PROGRESS NOTES
Patient is approximately 2 weeks status post suction D&C for 9-week mono/mono missed AB.  Overall physically doing well.  Very little bleeding and nearly resolved.  Has no pain.    Emotionally grieving but making progress.  Back to work.    Patient is not interested in birth control at this time.  May be considering pregnancy.  Discussed.    REVIEW OF SYSTEMS    Please see HPI for reported pertinent positives, which would supersede this ROS    Constitutional:  Denies fever, chills, wt gain or loss, fatigue    Genito-Urinary:  Denies genital lesion or sores, vaginal dryness, itching  or pain.  No abnormal vaginal discharge or unexplained vaginal bleeding.  No dysuria, urinary incontinence or frequency.  Denies pelvic pain, dysmenorrhea or dyspareunia.    Eyes:  Denies vision changes, dryness  ENT:  No hearing loss, sinus pain or congestion, nosebleeds  Cardiovascular:  No chest pain or palpitations  Respiratory:  No SOB, cough, wheezing  GI:  No Nausea, vomiting, diarrhea, constipation, abdominal pain  Musculoskeletal:  No new back pain. joint pain, peripheral edema  Skin:  No rash or skin lesion  Neurologic:  No HA, numbness or dizziness  Psychiatric:  No new anxiety or depression  Endocrine:  No loss of hair or hirsutism  Hematologic/lymphatic:  No swollen lymph nodes.  No reported tendency for easy bruising or bleeding    Patient admits to no other systemic complaints      There were no vitals filed for this visit.    PHYSICAL EXAM      PSYCH:  Pt is alert, oriented and cooperative    SKIN: warm, dry, w/o lesion    HEAD AND FACE:  External inspection of eyes, ears, functional cranial nerves normal and intact    THYROID:  No thyromegaly    CARDIOVASCULAR:  Warm and well Perfused    PULMONARY:  No respiratory distress    ABDOMEN:  soft, nontender.  No mass or organomegaly.      PELVIC:    External genitalia, urethra, perianal region normal to inspection and palpation if indicated.  No inguinal LA    Vagina without  lesions.    Cervix seen and visually normal      Bimanual exam:      No pelvic mass palpable    Uterus nontender, midline in pelvis    No adnexal masses or tenderness    Assessment/Plan    Diagnoses and all orders for this visit:  Missed , s/p D&C  F/up as needed

## 2024-04-11 ENCOUNTER — APPOINTMENT (OUTPATIENT)
Dept: OBSTETRICS AND GYNECOLOGY | Facility: CLINIC | Age: 34
End: 2024-04-11
Payer: COMMERCIAL

## 2025-03-13 ENCOUNTER — APPOINTMENT (OUTPATIENT)
Dept: PRIMARY CARE | Facility: CLINIC | Age: 35
End: 2025-03-13
Payer: COMMERCIAL

## 2025-03-13 VITALS
BODY MASS INDEX: 39.95 KG/M2 | SYSTOLIC BLOOD PRESSURE: 126 MMHG | DIASTOLIC BLOOD PRESSURE: 82 MMHG | HEIGHT: 64 IN | WEIGHT: 234 LBS

## 2025-03-13 DIAGNOSIS — E66.01 SEVERE OBESITY WITH SERIOUS COMORBIDITY AND BODY MASS INDEX (BMI) 120% OF 95TH PERCENTILE TO LESS THAN 140% OF 95TH PERCENTILE FOR AGE IN PEDIATRIC PATIENT, UNSPECIFIED OBESITY TYPE: Primary | ICD-10-CM

## 2025-03-13 DIAGNOSIS — Z84.89: ICD-10-CM

## 2025-03-13 DIAGNOSIS — M10.9: ICD-10-CM

## 2025-03-13 DIAGNOSIS — Z82.61 FAMILY HISTORY OF RHEUMATOID ARTHRITIS: ICD-10-CM

## 2025-03-13 DIAGNOSIS — E55.9 VITAMIN D DEFICIENCY DISEASE: ICD-10-CM

## 2025-03-13 DIAGNOSIS — R06.83 SNORING: ICD-10-CM

## 2025-03-13 DIAGNOSIS — Z68.55 SEVERE OBESITY WITH SERIOUS COMORBIDITY AND BODY MASS INDEX (BMI) 120% OF 95TH PERCENTILE TO LESS THAN 140% OF 95TH PERCENTILE FOR AGE IN PEDIATRIC PATIENT, UNSPECIFIED OBESITY TYPE: Primary | ICD-10-CM

## 2025-03-13 PROCEDURE — 99203 OFFICE O/P NEW LOW 30 MIN: CPT | Performed by: INTERNAL MEDICINE

## 2025-03-13 PROCEDURE — 3008F BODY MASS INDEX DOCD: CPT | Performed by: INTERNAL MEDICINE

## 2025-03-13 ASSESSMENT — ENCOUNTER SYMPTOMS
OCCASIONAL FEELINGS OF UNSTEADINESS: 0
DEPRESSION: 0
LOSS OF SENSATION IN FEET: 0

## 2025-03-14 LAB
25(OH)D3+25(OH)D2 SERPL-MCNC: 22 NG/ML (ref 30–100)
ALBUMIN SERPL-MCNC: 4.5 G/DL (ref 3.6–5.1)
ALP SERPL-CCNC: 50 U/L (ref 31–125)
ALT SERPL-CCNC: 21 U/L (ref 6–29)
ANA SER QL IF: NORMAL
ANION GAP SERPL CALCULATED.4IONS-SCNC: 6 MMOL/L (CALC) (ref 7–17)
APPEARANCE UR: CLEAR
AST SERPL-CCNC: 21 U/L (ref 10–30)
BASOPHILS # BLD AUTO: 20 CELLS/UL (ref 0–200)
BASOPHILS NFR BLD AUTO: 0.3 %
BILIRUB SERPL-MCNC: 0.3 MG/DL (ref 0.2–1.2)
BILIRUB UR QL STRIP: NEGATIVE
BUN SERPL-MCNC: 10 MG/DL (ref 7–25)
CALCIUM SERPL-MCNC: 9.3 MG/DL (ref 8.6–10.2)
CHLORIDE SERPL-SCNC: 105 MMOL/L (ref 98–110)
CK SERPL-CCNC: 270 U/L (ref 20–239)
CO2 SERPL-SCNC: 26 MMOL/L (ref 20–32)
COLOR UR: YELLOW
CREAT SERPL-MCNC: 0.79 MG/DL (ref 0.5–0.97)
EGFRCR SERPLBLD CKD-EPI 2021: 99 ML/MIN/1.73M2
ENA SM AB SER IA-ACNC: NORMAL AI
ENA SM+RNP AB SER IA-ACNC: NORMAL AI
EOSINOPHIL # BLD AUTO: 82 CELLS/UL (ref 15–500)
EOSINOPHIL NFR BLD AUTO: 1.2 %
ERYTHROCYTE [DISTWIDTH] IN BLOOD BY AUTOMATED COUNT: 14.5 % (ref 11–15)
ERYTHROCYTE [SEDIMENTATION RATE] IN BLOOD BY WESTERGREN METHOD: 22 MM/H
EST. AVERAGE GLUCOSE BLD GHB EST-MCNC: 128 MG/DL
EST. AVERAGE GLUCOSE BLD GHB EST-SCNC: 7.1 MMOL/L
GLUCOSE SERPL-MCNC: 91 MG/DL (ref 65–99)
GLUCOSE UR QL STRIP: NEGATIVE
HBA1C MFR BLD: 6.1 % OF TOTAL HGB
HCT VFR BLD AUTO: 40.7 % (ref 35–45)
HGB BLD-MCNC: 12.8 G/DL (ref 11.7–15.5)
HGB UR QL STRIP: NEGATIVE
KETONES UR QL STRIP: NEGATIVE
LEUKOCYTE ESTERASE UR QL STRIP: NEGATIVE
LYMPHOCYTES # BLD AUTO: 2604 CELLS/UL (ref 850–3900)
LYMPHOCYTES NFR BLD AUTO: 38.3 %
MCH RBC QN AUTO: 26 PG (ref 27–33)
MCHC RBC AUTO-ENTMCNC: 31.4 G/DL (ref 32–36)
MCV RBC AUTO: 82.6 FL (ref 80–100)
MONOCYTES # BLD AUTO: 340 CELLS/UL (ref 200–950)
MONOCYTES NFR BLD AUTO: 5 %
NEUTROPHILS # BLD AUTO: 3754 CELLS/UL (ref 1500–7800)
NEUTROPHILS NFR BLD AUTO: 55.2 %
NITRITE UR QL STRIP: NEGATIVE
PH UR STRIP: 7 [PH] (ref 5–8)
PLATELET # BLD AUTO: 288 THOUSAND/UL (ref 140–400)
PMV BLD REES-ECKER: 10.8 FL (ref 7.5–12.5)
POTASSIUM SERPL-SCNC: 4.2 MMOL/L (ref 3.5–5.3)
PROT SERPL-MCNC: 6.9 G/DL (ref 6.1–8.1)
PROT UR QL STRIP: NEGATIVE
RBC # BLD AUTO: 4.93 MILLION/UL (ref 3.8–5.1)
RHEUMATOID FACT SERPL-ACNC: <10 IU/ML
SODIUM SERPL-SCNC: 137 MMOL/L (ref 135–146)
SP GR UR STRIP: 1.02 (ref 1–1.03)
TSH SERPL-ACNC: 1.07 MIU/L
URATE SERPL-MCNC: 4.5 MG/DL (ref 2.5–7)
VIT B12 SERPL-MCNC: 722 PG/ML (ref 200–1100)
WBC # BLD AUTO: 6.8 THOUSAND/UL (ref 3.8–10.8)

## 2025-03-14 NOTE — PROGRESS NOTES
"Subjective   Patient ID: Hugo Kauffman is a 35 y.o. female who presents for Follow-up (Multiple issues).    This 35-year-old -American lady today came here for multiple issues.  She is  employee.  She told me she has gained a lot of weight after gave birth to children.  She is worried about arthritis.  She is worried about diabetes, cannot lose weight.  She given birth to three healthy children.  She has some miscarriages.  She is concerned.  Prenatal vitamin because of anemia from excessive menstruation.    CURRENT MEDICATIONS:  Only prenatal vitamin, folic acid because of anemia.    SOCIAL HISTORY:  Currently she is not using any birth control.  Menstruation is heavy.    I have personally reviewed the patient's Past Medical History, Medications, Allergies, Social History, and Family History in the EMR.    Review of Systems   All other systems reviewed and are negative.  She has never had heart attack, diabetes, or cancer.  No gestational diabetes. She gained her weight after giving birth to children.    Objective   /82   Ht 1.613 m (5' 3.5\")   Wt 106 kg (234 lb)   LMP 01/19/2024   BMI 40.80 kg/m²     Physical Exam  Vitals reviewed.   HENT:      Right Ear: Tympanic membrane, ear canal and external ear normal.      Left Ear: Tympanic membrane, ear canal and external ear normal.   Eyes:      General: No scleral icterus.     Pupils: Pupils are equal, round, and reactive to light.   Neck:      Vascular: No carotid bruit.   Cardiovascular:      Heart sounds: Normal heart sounds, S1 normal and S2 normal. No murmur heard.     No friction rub.   Pulmonary:      Effort: Pulmonary effort is normal.      Breath sounds: Normal breath sounds and air entry.   Chest:      Comments: BREAST:  Deferred by the patient.  Abdominal:      Palpations: There is no hepatomegaly, splenomegaly or mass.   Genitourinary:     Comments: VAGINAL:  Deferred by the patient.  RECTAL:  Deferred by the patient.  Musculoskeletal:   "       General: No swelling or deformity. Normal range of motion.      Cervical back: Neck supple.      Right lower leg: No edema.      Left lower leg: No edema.   Lymphadenopathy:      Cervical: No cervical adenopathy.      Upper Body:      Right upper body: No axillary adenopathy.      Left upper body: No axillary adenopathy.      Lower Body: No right inguinal adenopathy. No left inguinal adenopathy.   Neurological:      Mental Status: She is oriented to person, place, and time.      Cranial Nerves: Cranial nerves 2-12 are intact. No cranial nerve deficit.      Sensory: No sensory deficit.      Motor: Motor function is intact. No weakness.      Gait: Gait is intact.      Deep Tendon Reflexes: Reflexes normal.   Psychiatric:         Mood and Affect: Mood normal. Mood is not anxious or depressed. Affect is not angry.         Behavior: Behavior is not agitated.         Thought Content: Thought content normal.         Judgment: Judgment normal.     LAB WORK:  Laboratory testing discussed.    Assessment/Plan   Problem List Items Addressed This Visit    None  Visit Diagnoses         Codes    Severe obesity with serious comorbidity and body mass index (BMI) 120% of 95th percentile to less than 140% of 95th percentile for age in pediatric patient, unspecified obesity type    -  Primary E66.01, Z68.55    Relevant Orders    Comprehensive Metabolic Panel    CBC and Auto Differential    Urinalysis with Reflex Microscopic    Thyroid Stimulating Hormone    Hemoglobin A1C    SANDI + NISREEN Panel    Creatine Kinase    Rheumatoid Factor    Uric Acid    Vitamin D deficiency disease     E55.9    Relevant Orders    Uric Acid    Sedimentation Rate    Vitamin B12    Vitamin D 25-Hydroxy,Total (for eval of Vitamin D levels)    Arthrolithiasis     M10.9    Relevant Orders    SANDI + NISREEN Panel    Creatine Kinase    Rheumatoid Factor    Uric Acid    Sedimentation Rate    Family history of rheumatoid arthritis     Z82.61    Family history of  miscarriage     Z84.89    Snoring     R06.83        1. Weight gain.  I ordered thyroid, fasting sugar, and hemoglobin A1c.  2. Family history of rheumatoid as well as miscarriages.  I am going to check her for lupus and rheumatoid diseases.  3. Snoring.  Cannot lose weight.  I ordered home sleep apnea test.  4. Complete blood work ordered.  5. I shall see her in a week after test.  6. Gynecological.  Heavy menstruation associated anemia.  I urged her to see her gynecologist.  7. Follow-up with me in a week after test.    Josseline Attestation  By signing my name below, I, Josseline Gresham attest that this documentation has been prepared under the direction and in the presence of Isra Duque MD.     All medical record entries made by the josseline were personally dictated by me I have reviewed the chart and agree the record accurately reflects my personal performance of his history physical examination and management

## 2025-03-17 LAB
25(OH)D3+25(OH)D2 SERPL-MCNC: 22 NG/ML (ref 30–100)
ALBUMIN SERPL-MCNC: 4.5 G/DL (ref 3.6–5.1)
ALP SERPL-CCNC: 50 U/L (ref 31–125)
ALT SERPL-CCNC: 21 U/L (ref 6–29)
ANA PAT SER IF-IMP: ABNORMAL
ANA SER QL IF: POSITIVE
ANA TITR SER IF: ABNORMAL TITER
ANION GAP SERPL CALCULATED.4IONS-SCNC: 6 MMOL/L (CALC) (ref 7–17)
APPEARANCE UR: CLEAR
AST SERPL-CCNC: 21 U/L (ref 10–30)
BASOPHILS # BLD AUTO: 20 CELLS/UL (ref 0–200)
BASOPHILS NFR BLD AUTO: 0.3 %
BILIRUB SERPL-MCNC: 0.3 MG/DL (ref 0.2–1.2)
BILIRUB UR QL STRIP: NEGATIVE
BUN SERPL-MCNC: 10 MG/DL (ref 7–25)
CALCIUM SERPL-MCNC: 9.3 MG/DL (ref 8.6–10.2)
CHLORIDE SERPL-SCNC: 105 MMOL/L (ref 98–110)
CK SERPL-CCNC: 270 U/L (ref 20–239)
CO2 SERPL-SCNC: 26 MMOL/L (ref 20–32)
COLOR UR: YELLOW
CREAT SERPL-MCNC: 0.79 MG/DL (ref 0.5–0.97)
EGFRCR SERPLBLD CKD-EPI 2021: 99 ML/MIN/1.73M2
ENA SM AB SER IA-ACNC: NORMAL AI
ENA SM+RNP AB SER IA-ACNC: NORMAL AI
EOSINOPHIL # BLD AUTO: 82 CELLS/UL (ref 15–500)
EOSINOPHIL NFR BLD AUTO: 1.2 %
ERYTHROCYTE [DISTWIDTH] IN BLOOD BY AUTOMATED COUNT: 14.5 % (ref 11–15)
ERYTHROCYTE [SEDIMENTATION RATE] IN BLOOD BY WESTERGREN METHOD: 22 MM/H
EST. AVERAGE GLUCOSE BLD GHB EST-MCNC: 128 MG/DL
EST. AVERAGE GLUCOSE BLD GHB EST-SCNC: 7.1 MMOL/L
GLUCOSE SERPL-MCNC: 91 MG/DL (ref 65–99)
GLUCOSE UR QL STRIP: NEGATIVE
HBA1C MFR BLD: 6.1 % OF TOTAL HGB
HCT VFR BLD AUTO: 40.7 % (ref 35–45)
HGB BLD-MCNC: 12.8 G/DL (ref 11.7–15.5)
HGB UR QL STRIP: NEGATIVE
KETONES UR QL STRIP: NEGATIVE
LEUKOCYTE ESTERASE UR QL STRIP: NEGATIVE
LYMPHOCYTES # BLD AUTO: 2604 CELLS/UL (ref 850–3900)
LYMPHOCYTES NFR BLD AUTO: 38.3 %
MCH RBC QN AUTO: 26 PG (ref 27–33)
MCHC RBC AUTO-ENTMCNC: 31.4 G/DL (ref 32–36)
MCV RBC AUTO: 82.6 FL (ref 80–100)
MONOCYTES # BLD AUTO: 340 CELLS/UL (ref 200–950)
MONOCYTES NFR BLD AUTO: 5 %
NEUTROPHILS # BLD AUTO: 3754 CELLS/UL (ref 1500–7800)
NEUTROPHILS NFR BLD AUTO: 55.2 %
NITRITE UR QL STRIP: NEGATIVE
PH UR STRIP: 7 [PH] (ref 5–8)
PLATELET # BLD AUTO: 288 THOUSAND/UL (ref 140–400)
PMV BLD REES-ECKER: 10.8 FL (ref 7.5–12.5)
POTASSIUM SERPL-SCNC: 4.2 MMOL/L (ref 3.5–5.3)
PROT SERPL-MCNC: 6.9 G/DL (ref 6.1–8.1)
PROT UR QL STRIP: NEGATIVE
RBC # BLD AUTO: 4.93 MILLION/UL (ref 3.8–5.1)
RHEUMATOID FACT SERPL-ACNC: <10 IU/ML
SODIUM SERPL-SCNC: 137 MMOL/L (ref 135–146)
SP GR UR STRIP: 1.02 (ref 1–1.03)
TSH SERPL-ACNC: 1.07 MIU/L
URATE SERPL-MCNC: 4.5 MG/DL (ref 2.5–7)
VIT B12 SERPL-MCNC: 722 PG/ML (ref 200–1100)
WBC # BLD AUTO: 6.8 THOUSAND/UL (ref 3.8–10.8)

## 2025-03-21 ENCOUNTER — APPOINTMENT (OUTPATIENT)
Dept: PRIMARY CARE | Facility: CLINIC | Age: 35
End: 2025-03-21
Payer: COMMERCIAL

## 2025-03-21 VITALS
SYSTOLIC BLOOD PRESSURE: 128 MMHG | WEIGHT: 230 LBS | BODY MASS INDEX: 39.27 KG/M2 | HEIGHT: 64 IN | DIASTOLIC BLOOD PRESSURE: 80 MMHG

## 2025-03-21 DIAGNOSIS — E11.9 TYPE 2 DIABETES MELLITUS WITHOUT COMPLICATION, WITH LONG-TERM CURRENT USE OF INSULIN: ICD-10-CM

## 2025-03-21 DIAGNOSIS — E13.9 OTHER SPECIFIED DIABETES MELLITUS WITHOUT COMPLICATION, WITHOUT LONG-TERM CURRENT USE OF INSULIN: ICD-10-CM

## 2025-03-21 DIAGNOSIS — R79.89 LOW VITAMIN D LEVEL: Primary | ICD-10-CM

## 2025-03-21 DIAGNOSIS — M08.40: ICD-10-CM

## 2025-03-21 DIAGNOSIS — Z79.4 TYPE 2 DIABETES MELLITUS WITHOUT COMPLICATION, WITH LONG-TERM CURRENT USE OF INSULIN: ICD-10-CM

## 2025-03-21 RX ORDER — LANCETS
EACH MISCELLANEOUS
Qty: 200 EACH | Refills: 5 | Status: SHIPPED | OUTPATIENT
Start: 2025-03-21

## 2025-03-21 RX ORDER — DEXTROSE 4 G
TABLET,CHEWABLE ORAL
Qty: 1 EACH | Refills: 0 | Status: SHIPPED | OUTPATIENT
Start: 2025-03-21

## 2025-03-21 RX ORDER — METFORMIN HYDROCHLORIDE 500 MG/1
500 TABLET ORAL
Qty: 60 TABLET | Refills: 0 | Status: SHIPPED | OUTPATIENT
Start: 2025-03-21

## 2025-03-21 RX ORDER — IBUPROFEN 200 MG
CAPSULE ORAL
Qty: 200 EACH | Refills: 5 | Status: SHIPPED | OUTPATIENT
Start: 2025-03-21

## 2025-03-22 NOTE — PROGRESS NOTES
"Subjective   Patient ID: Hugo Kauffman is a 35 y.o. female who presents for Follow-up (Multiple medical issues).    Ms. Kauffman today came here for multiple medical issues.  Bone and joint arthritis.  Mother has rheumatoid arthritis.  She is concerned.  She came here for blood work.  She is trying to lose weight, but difficult.    I have personally reviewed the patient's Past Medical History, Medications, Allergies, Social History, and Family History in the EMR.    Review of Systems   All other systems reviewed and are negative.    Objective   /80   Ht 1.613 m (5' 3.5\")   Wt 104 kg (230 lb)   LMP 01/19/2024   BMI 40.10 kg/m²     Physical Exam  Vitals reviewed.   Cardiovascular:      Heart sounds: Normal heart sounds, S1 normal and S2 normal. No murmur heard.     No friction rub.   Pulmonary:      Effort: Pulmonary effort is normal.      Breath sounds: Normal breath sounds and air entry.   Abdominal:      Palpations: There is no hepatomegaly, splenomegaly or mass.   Musculoskeletal:      Right lower leg: No edema.      Left lower leg: No edema.   Lymphadenopathy:      Lower Body: No right inguinal adenopathy. No left inguinal adenopathy.   Neurological:      Cranial Nerves: Cranial nerves 2-12 are intact.      Sensory: No sensory deficit.      Motor: Motor function is intact.      Deep Tendon Reflexes: Reflexes are normal and symmetric.     LAB WORK:  Laboratory testing discussed.    Assessment/Plan   Problem List Items Addressed This Visit    None  Visit Diagnoses         Codes    Low vitamin D level    -  Primary R79.89    Other specified diabetes mellitus without complication, without long-term current use of insulin     E13.9    Relevant Medications    blood sugar diagnostic (Blood Glucose Test) strip    blood-glucose meter misc    lancets misc    metFORMIN (Glucophage) 500 mg tablet    Pauciarticular juvenile idiopathic arthritis, SANDI positive (Multi)     M08.40    Type 2 diabetes mellitus without " complication, with long-term current use of insulin (Multi)     E11.9, Z79.4        1. Low vitamin D.  Given vitamin D.  2. SANDI-positive arthritis.  Mother has rheumatoid arthritis.  Refer to rheumatologist for further evaluation.  3. Type 2 diabetes, new onset.  I gave her glucometer, strip, Lancet, metformin started.  Refer to dietician and diabetician.  4. Follow-up appointment with me in four weeks.    Scribe Attestation  By signing my name below, I, Josseline Gresham attest that this documentation has been prepared under the direction and in the presence of Isra Duque MD.     All medical record entries made by the josseline were personally dictated by me I have reviewed the chart and agree the record accurately reflects my personal performance of his history physical examination and management

## 2025-04-04 ENCOUNTER — APPOINTMENT (OUTPATIENT)
Dept: OBSTETRICS AND GYNECOLOGY | Facility: CLINIC | Age: 35
End: 2025-04-04
Payer: COMMERCIAL

## 2025-04-08 ENCOUNTER — APPOINTMENT (OUTPATIENT)
Dept: OBSTETRICS AND GYNECOLOGY | Facility: CLINIC | Age: 35
End: 2025-04-08
Payer: COMMERCIAL

## 2025-04-29 ENCOUNTER — APPOINTMENT (OUTPATIENT)
Dept: OBSTETRICS AND GYNECOLOGY | Facility: CLINIC | Age: 35
End: 2025-04-29
Payer: COMMERCIAL

## 2025-04-29 VITALS
HEIGHT: 64 IN | WEIGHT: 230 LBS | BODY MASS INDEX: 39.27 KG/M2 | SYSTOLIC BLOOD PRESSURE: 102 MMHG | DIASTOLIC BLOOD PRESSURE: 74 MMHG

## 2025-04-29 DIAGNOSIS — N91.1 AMENORRHEA, SECONDARY: ICD-10-CM

## 2025-04-29 PROBLEM — R73.03 PRE-DIABETES: Status: ACTIVE | Noted: 2025-04-29

## 2025-04-29 LAB — PREGNANCY TEST URINE, POC: POSITIVE

## 2025-04-29 PROCEDURE — 3008F BODY MASS INDEX DOCD: CPT | Performed by: OBSTETRICS & GYNECOLOGY

## 2025-04-29 PROCEDURE — 81025 URINE PREGNANCY TEST: CPT | Performed by: OBSTETRICS & GYNECOLOGY

## 2025-04-29 PROCEDURE — 99214 OFFICE O/P EST MOD 30 MIN: CPT | Performed by: OBSTETRICS & GYNECOLOGY

## 2025-04-29 PROCEDURE — 1036F TOBACCO NON-USER: CPT | Performed by: OBSTETRICS & GYNECOLOGY

## 2025-04-29 RX ORDER — LANCETS 33 GAUGE
EACH MISCELLANEOUS
COMMUNITY
Start: 2025-03-21

## 2025-04-29 ASSESSMENT — PAIN SCALES - GENERAL: PAINLEVEL_OUTOF10: 0-NO PAIN

## 2025-04-29 NOTE — PROGRESS NOTES
"Lmp 3/31/25  Rh pos      Scheduled visit for fertility concerns but 2 days ago had a positive pregnancy test.  Last menstrual period March 31.  Recently was diagnosed as \"prediabetic with hemoglobin A1c of 6.1.  Was on metformin but we will have her stop this and check blood sugars fasting and 1 hour postprandial and recheck back with me in 1 to 2 weeks.  Discussed potential implications of diabetes, but reasonable recent A1c.    3 previous vaginal deliveries, all at term.  Baby's weights were 6 pounds, 6 pounds 4 ounces, 8 pounds 14 ounces.  All deliveries were not complicated by postpartum hemorrhage or shoulder dystocia.  Denies history of hypertensive disorders of pregnancy or gestational diabetes.    REVIEW OF SYSTEMS    Please see HPI for reported pertinent positives, which would supersede this ROS    Constitutional:  Denies fever, chills, wt gain or loss, fatigue    Genito-Urinary:  Denies genital lesion or sores, vaginal dryness, itching  or pain.  No abnormal vaginal discharge or unexplained vaginal bleeding.  No dysuria, urinary incontinence or frequency.  Denies pelvic pain, dysmenorrhea or dyspareunia.    Eyes:  Denies vision changes, dryness  ENT:  No hearing loss, sinus pain or congestion, nosebleeds  Cardiovascular:  No chest pain or palpitations  Respiratory:  No SOB, cough, wheezing  GI:  No Nausea, vomiting, diarrhea, constipation, abdominal pain  Musculoskeletal:  No new back pain. joint pain, peripheral edema  Skin:  No rash or skin lesion  Neurologic:  No HA, numbness or dizziness  Psychiatric:  No new anxiety or depression  Endocrine:  No loss of hair or hirsutism  Hematologic/lymphatic:  No swollen lymph nodes.  No reported tendency for easy bruising or bleeding    Patient admits to no other systemic complaints      Vitals:    04/29/25 1128   BP: 102/74       PHYSICAL EXAM      PSYCH:  Pt is alert, oriented and cooperative    SKIN: warm, dry, w/o lesion    HEAD AND FACE:  External inspection " of eyes, ears, functional cranial nerves normal and intact    THYROID:  No thyromegaly    CARDIOVASCULAR:  Warm and well Perfused    PULMONARY:  No respiratory distress    ABDOMEN:  soft, nontender.  No mass or organomegaly.      PELVIC:    External genitalia, urethra, perianal region normal to inspection and palpation if indicated.  No inguinal LA    Vagina without lesions.    Cervix seen and visually normal      Assessment/Plan    Diagnoses and all orders for this visit:  Amenorrhea, secondary -plan serial hCG levels and then follow-up in 3 weeks for new OB and ultrasound.  -     POCT pregnancy, urine manually resulted  -     US OB LESS THAN 14 WEEKS EARLY; Future  -     QUEST HCG, TOTAL, QN; Standing  -     C. trachomatis / N. gonorrhoeae, Amplified, Urogenital

## 2025-04-30 DIAGNOSIS — N91.1 AMENORRHEA, SECONDARY: ICD-10-CM

## 2025-04-30 LAB
C TRACH RRNA SPEC QL NAA+PROBE: NOT DETECTED
N GONORRHOEA RRNA SPEC QL NAA+PROBE: NOT DETECTED
QUEST GC CT AMPLIFIED (ALWAYS MESSAGE): NORMAL

## 2025-05-01 DIAGNOSIS — N91.1 AMENORRHEA, SECONDARY: ICD-10-CM

## 2025-05-01 LAB — BACTERIA UR CULT: NORMAL

## 2025-05-02 DIAGNOSIS — N91.1 AMENORRHEA, SECONDARY: ICD-10-CM

## 2025-05-02 LAB — B-HCG SERPL-ACNC: 2230 MIU/ML

## 2025-05-03 DIAGNOSIS — N91.1 AMENORRHEA, SECONDARY: ICD-10-CM

## 2025-05-04 DIAGNOSIS — N91.1 AMENORRHEA, SECONDARY: ICD-10-CM

## 2025-05-05 DIAGNOSIS — N91.1 SECONDARY AMENORRHEA: Primary | ICD-10-CM

## 2025-05-05 LAB — B-HCG SERPL-ACNC: 5533 MIU/ML

## 2025-05-15 LAB
ALBUMIN SERPL-MCNC: 4.1 G/DL (ref 3.6–5.1)
ALP SERPL-CCNC: 42 U/L (ref 31–125)
ALT SERPL-CCNC: 28 U/L (ref 6–29)
ANION GAP SERPL CALCULATED.4IONS-SCNC: 9 MMOL/L (CALC) (ref 7–17)
AST SERPL-CCNC: 17 U/L (ref 10–30)
BACTERIA UR CULT: NORMAL
BILIRUB SERPL-MCNC: 0.2 MG/DL (ref 0.2–1.2)
BUN SERPL-MCNC: 11 MG/DL (ref 7–25)
CALCIUM SERPL-MCNC: 9 MG/DL (ref 8.6–10.2)
CHLORIDE SERPL-SCNC: 104 MMOL/L (ref 98–110)
CO2 SERPL-SCNC: 21 MMOL/L (ref 20–32)
CREAT SERPL-MCNC: 0.67 MG/DL (ref 0.5–0.97)
CREAT UR-MCNC: 138 MG/DL (ref 20–275)
EGFRCR SERPLBLD CKD-EPI 2021: 116 ML/MIN/1.73M2
ERYTHROCYTE [DISTWIDTH] IN BLOOD BY AUTOMATED COUNT: 15.1 % (ref 11–15)
EST. AVERAGE GLUCOSE BLD GHB EST-MCNC: 120 MG/DL
EST. AVERAGE GLUCOSE BLD GHB EST-SCNC: 6.6 MMOL/L
GLUCOSE SERPL-MCNC: 103 MG/DL (ref 65–99)
HBA1C MFR BLD: 5.8 %
HBV SURFACE AG SERPL QL IA: NORMAL
HCT VFR BLD AUTO: 35.5 % (ref 35–45)
HCV AB SERPL QL IA: NORMAL
HGB BLD-MCNC: 11.3 G/DL (ref 11.7–15.5)
HIV 1+2 AB+HIV1 P24 AG SERPL QL IA: NORMAL
LDH SERPL P TO L-CCNC: 146 U/L (ref 100–200)
MCH RBC QN AUTO: 26.3 PG (ref 27–33)
MCHC RBC AUTO-ENTMCNC: 31.8 G/DL (ref 32–36)
MCV RBC AUTO: 82.8 FL (ref 80–100)
PLATELET # BLD AUTO: 276 THOUSAND/UL (ref 140–400)
PMV BLD REES-ECKER: 10.6 FL (ref 7.5–12.5)
POTASSIUM SERPL-SCNC: 3.7 MMOL/L (ref 3.5–5.3)
PROT SERPL-MCNC: 6.5 G/DL (ref 6.1–8.1)
PROT UR-MCNC: 9 MG/DL (ref 5–24)
PROT/CREAT UR: 0.07 MG/MG CREAT (ref 0.02–0.18)
PROT/CREAT UR: 65 MG/G CREAT (ref 24–184)
RBC # BLD AUTO: 4.29 MILLION/UL (ref 3.8–5.1)
RUBV IGG SERPL IA-ACNC: 3.93 INDEX
SODIUM SERPL-SCNC: 134 MMOL/L (ref 135–146)
T PALLIDUM AB SER QL IA: NEGATIVE
URATE SERPL-MCNC: 4.2 MG/DL (ref 2.5–7)
VZV IGG SER IA-ACNC: 6.25 S/CO
WBC # BLD AUTO: 7.9 THOUSAND/UL (ref 3.8–10.8)

## 2025-05-27 ENCOUNTER — APPOINTMENT (OUTPATIENT)
Dept: RADIOLOGY | Facility: CLINIC | Age: 35
End: 2025-05-27
Payer: COMMERCIAL

## 2025-05-27 ENCOUNTER — APPOINTMENT (OUTPATIENT)
Dept: OBSTETRICS AND GYNECOLOGY | Facility: CLINIC | Age: 35
End: 2025-05-27
Payer: COMMERCIAL

## 2025-05-27 VITALS — BODY MASS INDEX: 40.28 KG/M2 | DIASTOLIC BLOOD PRESSURE: 78 MMHG | SYSTOLIC BLOOD PRESSURE: 100 MMHG | WEIGHT: 231 LBS

## 2025-05-27 DIAGNOSIS — R73.03 PRE-DIABETES: ICD-10-CM

## 2025-05-27 DIAGNOSIS — Z34.81 MULTIGRAVIDA IN FIRST TRIMESTER (HHS-HCC): Primary | ICD-10-CM

## 2025-05-27 DIAGNOSIS — N91.1 AMENORRHEA, SECONDARY: ICD-10-CM

## 2025-05-27 DIAGNOSIS — N91.1 SECONDARY AMENORRHEA: ICD-10-CM

## 2025-05-27 DIAGNOSIS — Z3A.08 8 WEEKS GESTATION OF PREGNANCY (HHS-HCC): ICD-10-CM

## 2025-05-27 DIAGNOSIS — Z36.82 NUCHAL TRANSLUCENCY OF FETUS ON PRENATAL ULTRASOUND (HHS-HCC): ICD-10-CM

## 2025-05-27 DIAGNOSIS — O09.521 AMA (ADVANCED MATERNAL AGE) MULTIGRAVIDA 35+, FIRST TRIMESTER (HHS-HCC): ICD-10-CM

## 2025-05-27 PROCEDURE — 76801 OB US < 14 WKS SINGLE FETUS: CPT | Performed by: OBSTETRICS & GYNECOLOGY

## 2025-05-27 PROCEDURE — 99213 OFFICE O/P EST LOW 20 MIN: CPT | Performed by: OBSTETRICS & GYNECOLOGY

## 2025-05-27 ASSESSMENT — EDINBURGH POSTNATAL DEPRESSION SCALE (EPDS)
I HAVE FELT SCARED OR PANICKY FOR NO GOOD REASON: NO, NOT AT ALL
I HAVE BEEN ABLE TO LAUGH AND SEE THE FUNNY SIDE OF THINGS: AS MUCH AS I ALWAYS COULD
TOTAL SCORE: 0
I HAVE BEEN SO UNHAPPY THAT I HAVE BEEN CRYING: NO, NEVER
THE THOUGHT OF HARMING MYSELF HAS OCCURRED TO ME: NEVER
I HAVE BEEN SO UNHAPPY THAT I HAVE HAD DIFFICULTY SLEEPING: NOT AT ALL
I HAVE BEEN ANXIOUS OR WORRIED FOR NO GOOD REASON: NO, NOT AT ALL
THINGS HAVE BEEN GETTING ON TOP OF ME: NO, I HAVE BEEN COPING AS WELL AS EVER
I HAVE LOOKED FORWARD WITH ENJOYMENT TO THINGS: AS MUCH AS I EVER DID
I HAVE BLAMED MYSELF UNNECESSARILY WHEN THINGS WENT WRONG: NO, NEVER
I HAVE FELT SAD OR MISERABLE: NO, NOT AT ALL

## 2025-05-27 ASSESSMENT — PATIENT HEALTH QUESTIONNAIRE - PHQ9
2. FEELING DOWN, DEPRESSED OR HOPELESS: NOT AT ALL
SUM OF ALL RESPONSES TO PHQ9 QUESTIONS 1 AND 2: 0
1. LITTLE INTEREST OR PLEASURE IN DOING THINGS: NOT AT ALL

## 2025-05-27 NOTE — PROGRESS NOTES
Chief Complaint   Patient presents with    Initial Prenatal Visit     New OB    [id number]  /78   Wt 105 kg (231 lb)   LMP 2025   BMI 40.28 kg/m²   OB Status Pregnant   Smoking Status Never   BSA 2.17 m²      No complaints; feeling well.  Would like NIPT next visit.     in room with patient.     Patient feels well.  Cantu intrauterine pregnancy.   planning vasectomy after pregnancy.    Fasting blood sugars in the 70s.  1 hour postprandials are all in the 80s to 1 teens.  No evidence of overt diabetes at this point.  Patient is going to do a type and screen and repeat her A1c in 3 weeks.    Assessment/Plan    Diagnoses and all orders for this visit:  Multigravida in first trimester ([facility])  -     Type And Screen Is this order related to pregnancy or an upcoming surgery? Yes; Where will this surgery/delivery be performed? [facility] [place]; What is the date of the surgery? 2026; Has this patient ever had a transfusion? Unknown; Has t...; Future  Secondary amenorrhea  -     Type And Screen Is this order related to pregnancy or an upcoming surgery? Yes; Where will this surgery/delivery be performed? [facility] [place]; What is the date of the surgery? 2026; Has this patient ever had a transfusion? Unknown; Has t...; Future  -     Hemoglobin A1C; Future  Pre-diabetes  8 weeks gestation of pregnancy ([facility])  AMA (advanced maternal age) multigravida 35+, first trimester ([facility])  -     US OB NT (NUCHAL translucency); Future  Nuchal translucency of fetus on prenatal ultrasound ([facility])  -     US OB NT (NUCHAL translucency); Future      Assessment/Plan    Diagnoses and all orders for this visit:  Multigravida in first trimester ([facility])  -     Type And Screen Is this order related to pregnancy or an upcoming surgery? Yes; Where will this surgery/delivery be performed? [facility] [place]; What is the date of the surgery? 2026; Has this patient ever had a  transfusion? Unknown; Has t...; Future  Secondary amenorrhea  -     Type And Screen Is this order related to pregnancy or an upcoming surgery? Yes; Where will this surgery/delivery be performed? [facility] [place][note 2[place] What is the date of the surgery? 1/5/2026; Has this patient ever had a transfusion? Unknown; Has t...; Future  -     Hemoglobin A1C; Future  Pre-diabetes  8 weeks gestation of pregnancy ([facility])  AMA (advanced maternal age) multigravida 35+, first trimester ([facility])  -     US OB NT (NUCHAL translucency); Future  Nuchal translucency of fetus on prenatal ultrasound ([facility])  -     US OB NT (NUCHAL translucency); Future

## 2025-06-02 ENCOUNTER — HOSPITAL ENCOUNTER (EMERGENCY)
Facility: HOSPITAL | Age: 35
Discharge: HOME | End: 2025-06-02
Attending: EMERGENCY MEDICINE
Payer: COMMERCIAL

## 2025-06-02 ENCOUNTER — APPOINTMENT (OUTPATIENT)
Dept: RADIOLOGY | Facility: HOSPITAL | Age: 35
End: 2025-06-02
Payer: COMMERCIAL

## 2025-06-02 ENCOUNTER — PATIENT MESSAGE (OUTPATIENT)
Dept: OBSTETRICS AND GYNECOLOGY | Facility: CLINIC | Age: 35
End: 2025-06-02
Payer: COMMERCIAL

## 2025-06-02 VITALS
HEART RATE: 80 BPM | DIASTOLIC BLOOD PRESSURE: 72 MMHG | RESPIRATION RATE: 18 BRPM | BODY MASS INDEX: 41.05 KG/M2 | SYSTOLIC BLOOD PRESSURE: 150 MMHG | HEIGHT: 63 IN | TEMPERATURE: 98 F | WEIGHT: 231.7 LBS | OXYGEN SATURATION: 97 %

## 2025-06-02 DIAGNOSIS — D64.9 ANEMIA, UNSPECIFIED TYPE: ICD-10-CM

## 2025-06-02 DIAGNOSIS — O20.8 SUBCHORIONIC HEMORRHAGE OF PLACENTA IN FIRST TRIMESTER (HHS-HCC): ICD-10-CM

## 2025-06-02 DIAGNOSIS — O20.0 THREATENED MISCARRIAGE (HHS-HCC): Primary | ICD-10-CM

## 2025-06-02 DIAGNOSIS — N39.0 ACUTE LOWER URINARY TRACT INFECTION: ICD-10-CM

## 2025-06-02 DIAGNOSIS — I10 HYPERTENSION, UNSPECIFIED TYPE: ICD-10-CM

## 2025-06-02 DIAGNOSIS — R10.30 LOWER ABDOMINAL PAIN: ICD-10-CM

## 2025-06-02 DIAGNOSIS — D25.9 UTERINE LEIOMYOMA, UNSPECIFIED LOCATION: ICD-10-CM

## 2025-06-02 LAB
ABO GROUP (TYPE) IN BLOOD: NORMAL
ALBUMIN SERPL BCP-MCNC: 4.1 G/DL (ref 3.4–5)
ALP SERPL-CCNC: 40 U/L (ref 33–110)
ALT SERPL W P-5'-P-CCNC: 25 U/L (ref 7–45)
AMORPH CRY #/AREA UR COMP ASSIST: ABNORMAL /HPF
ANION GAP SERPL CALCULATED.3IONS-SCNC: 13 MMOL/L (ref 10–20)
APPEARANCE UR: ABNORMAL
AST SERPL W P-5'-P-CCNC: 16 U/L (ref 9–39)
B-HCG SERPL-ACNC: ABNORMAL MIU/ML
BACTERIA #/AREA URNS AUTO: ABNORMAL /HPF
BASOPHILS # BLD AUTO: 0.03 X10*3/UL (ref 0–0.1)
BASOPHILS NFR BLD AUTO: 0.4 %
BILIRUB SERPL-MCNC: 0.3 MG/DL (ref 0–1.2)
BILIRUB UR STRIP.AUTO-MCNC: NEGATIVE MG/DL
BUN SERPL-MCNC: 10 MG/DL (ref 6–23)
CALCIUM SERPL-MCNC: 8.8 MG/DL (ref 8.6–10.3)
CHLORIDE SERPL-SCNC: 106 MMOL/L (ref 98–107)
CO2 SERPL-SCNC: 21 MMOL/L (ref 21–32)
COLOR UR: YELLOW
CREAT SERPL-MCNC: 0.73 MG/DL (ref 0.5–1.05)
EGFRCR SERPLBLD CKD-EPI 2021: >90 ML/MIN/1.73M*2
EOSINOPHIL # BLD AUTO: 0.07 X10*3/UL (ref 0–0.7)
EOSINOPHIL NFR BLD AUTO: 1 %
ERYTHROCYTE [DISTWIDTH] IN BLOOD BY AUTOMATED COUNT: 14.8 % (ref 11.5–14.5)
GLUCOSE SERPL-MCNC: 94 MG/DL (ref 74–99)
GLUCOSE UR STRIP.AUTO-MCNC: NORMAL MG/DL
HCT VFR BLD AUTO: 36.7 % (ref 36–46)
HGB BLD-MCNC: 11.9 G/DL (ref 12–16)
IMM GRANULOCYTES # BLD AUTO: 0.01 X10*3/UL (ref 0–0.7)
IMM GRANULOCYTES NFR BLD AUTO: 0.1 % (ref 0–0.9)
KETONES UR STRIP.AUTO-MCNC: NEGATIVE MG/DL
LEUKOCYTE ESTERASE UR QL STRIP.AUTO: ABNORMAL
LYMPHOCYTES # BLD AUTO: 2.2 X10*3/UL (ref 1.2–4.8)
LYMPHOCYTES NFR BLD AUTO: 31.7 %
MCH RBC QN AUTO: 26.3 PG (ref 26–34)
MCHC RBC AUTO-ENTMCNC: 32.4 G/DL (ref 32–36)
MCV RBC AUTO: 81 FL (ref 80–100)
MONOCYTES # BLD AUTO: 0.39 X10*3/UL (ref 0.1–1)
MONOCYTES NFR BLD AUTO: 5.6 %
NEUTROPHILS # BLD AUTO: 4.24 X10*3/UL (ref 1.2–7.7)
NEUTROPHILS NFR BLD AUTO: 61.2 %
NITRITE UR QL STRIP.AUTO: NEGATIVE
NRBC BLD-RTO: 0 /100 WBCS (ref 0–0)
PH UR STRIP.AUTO: 7 [PH]
PLATELET # BLD AUTO: 249 X10*3/UL (ref 150–450)
POTASSIUM SERPL-SCNC: 3.7 MMOL/L (ref 3.5–5.3)
PROT SERPL-MCNC: 7 G/DL (ref 6.4–8.2)
PROT UR STRIP.AUTO-MCNC: ABNORMAL MG/DL
RBC # BLD AUTO: 4.53 X10*6/UL (ref 4–5.2)
RBC # UR STRIP.AUTO: ABNORMAL MG/DL
RBC #/AREA URNS AUTO: ABNORMAL /HPF
RH FACTOR (ANTIGEN D): NORMAL
SODIUM SERPL-SCNC: 136 MMOL/L (ref 136–145)
SP GR UR STRIP.AUTO: 1.02
SQUAMOUS #/AREA URNS AUTO: ABNORMAL /HPF
UROBILINOGEN UR STRIP.AUTO-MCNC: NORMAL MG/DL
WBC # BLD AUTO: 6.9 X10*3/UL (ref 4.4–11.3)
WBC #/AREA URNS AUTO: ABNORMAL /HPF

## 2025-06-02 PROCEDURE — 96360 HYDRATION IV INFUSION INIT: CPT

## 2025-06-02 PROCEDURE — 76815 OB US LIMITED FETUS(S): CPT | Performed by: RADIOLOGY

## 2025-06-02 PROCEDURE — 2500000001 HC RX 250 WO HCPCS SELF ADMINISTERED DRUGS (ALT 637 FOR MEDICARE OP): Performed by: EMERGENCY MEDICINE

## 2025-06-02 PROCEDURE — 99284 EMERGENCY DEPT VISIT MOD MDM: CPT | Mod: 25 | Performed by: EMERGENCY MEDICINE

## 2025-06-02 PROCEDURE — 84702 CHORIONIC GONADOTROPIN TEST: CPT | Performed by: EMERGENCY MEDICINE

## 2025-06-02 PROCEDURE — 85025 COMPLETE CBC W/AUTO DIFF WBC: CPT | Performed by: EMERGENCY MEDICINE

## 2025-06-02 PROCEDURE — 87086 URINE CULTURE/COLONY COUNT: CPT | Mod: TRILAB | Performed by: EMERGENCY MEDICINE

## 2025-06-02 PROCEDURE — 2500000004 HC RX 250 GENERAL PHARMACY W/ HCPCS (ALT 636 FOR OP/ED): Mod: JZ | Performed by: EMERGENCY MEDICINE

## 2025-06-02 PROCEDURE — 80053 COMPREHEN METABOLIC PANEL: CPT | Performed by: EMERGENCY MEDICINE

## 2025-06-02 PROCEDURE — 76801 OB US < 14 WKS SINGLE FETUS: CPT

## 2025-06-02 PROCEDURE — 86900 BLOOD TYPING SEROLOGIC ABO: CPT | Performed by: EMERGENCY MEDICINE

## 2025-06-02 PROCEDURE — 81001 URINALYSIS AUTO W/SCOPE: CPT | Performed by: EMERGENCY MEDICINE

## 2025-06-02 PROCEDURE — 36415 COLL VENOUS BLD VENIPUNCTURE: CPT | Performed by: EMERGENCY MEDICINE

## 2025-06-02 RX ORDER — ACETAMINOPHEN 325 MG/1
650 TABLET ORAL EVERY 6 HOURS PRN
Qty: 30 TABLET | Refills: 0 | Status: SHIPPED | OUTPATIENT
Start: 2025-06-02 | End: 2025-06-12

## 2025-06-02 RX ORDER — ACETAMINOPHEN 325 MG/1
975 TABLET ORAL ONCE
Status: DISCONTINUED | OUTPATIENT
Start: 2025-06-02 | End: 2025-06-02 | Stop reason: HOSPADM

## 2025-06-02 RX ORDER — CEPHALEXIN 500 MG/1
500 CAPSULE ORAL 2 TIMES DAILY
Qty: 14 CAPSULE | Refills: 0 | Status: SHIPPED | OUTPATIENT
Start: 2025-06-02 | End: 2025-06-06 | Stop reason: DRUGHIGH

## 2025-06-02 RX ADMIN — SODIUM CHLORIDE 1000 ML: 900 INJECTION, SOLUTION INTRAVENOUS at 08:43

## 2025-06-02 ASSESSMENT — PAIN - FUNCTIONAL ASSESSMENT: PAIN_FUNCTIONAL_ASSESSMENT: 0-10

## 2025-06-02 ASSESSMENT — PAIN DESCRIPTION - FREQUENCY: FREQUENCY: CONSTANT/CONTINUOUS

## 2025-06-02 ASSESSMENT — PAIN DESCRIPTION - ONSET: ONSET: SUDDEN

## 2025-06-02 ASSESSMENT — PAIN DESCRIPTION - PROGRESSION: CLINICAL_PROGRESSION: NOT CHANGED

## 2025-06-02 ASSESSMENT — PAIN DESCRIPTION - DESCRIPTORS: DESCRIPTORS: CRAMPING

## 2025-06-02 ASSESSMENT — PAIN DESCRIPTION - PAIN TYPE: TYPE: ACUTE PAIN

## 2025-06-02 ASSESSMENT — PAIN DESCRIPTION - LOCATION: LOCATION: ABDOMEN

## 2025-06-02 ASSESSMENT — PAIN DESCRIPTION - ORIENTATION: ORIENTATION: LEFT;RIGHT

## 2025-06-02 ASSESSMENT — PAIN SCALES - GENERAL: PAINLEVEL_OUTOF10: 4

## 2025-06-02 NOTE — DISCHARGE INSTRUCTIONS
Follow-up with your primary care physician within 1 to 2 days for further management of your current symptoms, repeat check of your blood pressure and review of the urine culture results.      Follow-up with your OB/GYN within 2 days for further management of her current symptoms and repeat quantitative hCG measurement.      Return to the emergency department sooner with worsening of symptoms or onset of new symptoms

## 2025-06-02 NOTE — PROGRESS NOTES
Attestation/Supervisory note for FREDERICK Gentile      The patient is a 35-year-old female presenting to the emergency department for evaluation of lower pelvic cramping and vaginal spotting for the past several hours.  She states that she is 9 weeks pregnant.  Her OB/GYN is Dr. Uribe.  She denies any recent injury or trauma.  No headache or visual changes.  No neck or back pain.  No chest pain or shortness of breath.  No urinary complaints.  No vaginal discharge other than the vaginal spotting.  No focal weakness or numbness.  No diarrhea or constipation.  The abdominal pain is a lower pelvic cramping.  No better or worse.  No radiation.  She denies any fever or chills.  All pertinent positives and negatives are recorded above.  All other systems reviewed and otherwise negative.  Vital signs with borderline hypertension but other within normal limits.  Physical exam with a well-nourished well-developed female in no acute distress.  HEENT exam within normal limits.  She has no evidence of airway compromise or respiratory distress.  Abdominal exam with mild tenderness to palpation in the suprapubic region but no rebound or guarding.  No palpable masses.  No flank pain with percussion or palpation.  She does not have any gross motor, neurologic or vascular deficits on exam.  Pulses are equal bilaterally.      Oral acetaminophen and IV fluids ordered      Diagnostic labs with evidence of a urinary tract infection and mild anemia but otherwise unremarkable      ABO blood type is A+      Quantitative hCG 70, 568      US OB LESS THAN 14 WEEKS EARLY   Final Result   1. Single viable intrauterine pregnancy. Estimated sonographic   gestational age 9 weeks 3 days. Fetal heart rate 169 beats per   minute. Estimated delivery date January 5, 2026.   2. Gestational sac is lower in the uterine endometrium. This appears   to be slightly lower than on the prior examination.   3. There are 2 suspected subchorionic hemorrhages. These  were not   identified in the prior study.   4. Vague hypoechoic ovoid focus in the fundus of the uterus measures   27 x 19 x 32 mm possible fibroid.        MACRO:   None        Signed by: Lefty Green 6/2/2025 10:40 AM   Dictation workstation:   [id number]           The patient does not have any evidence of airway compromise or respiratory distress on exam.  No evidence of hemodynamic instability.  She is well-perfused on exam.  Abdominal exam is benign.  Diagnostic labs with evidence of urinary tract infection and mild anemia but otherwise unremarkable.  Pelvic ultrasound shows a single viable intrauterine pregnancy at approximately 9 weeks 3 days.  Heart rate of 169.  There are 2 suspected subchorionic hemorrhages.  The patient also has a possible fibroid.  The results were discussed with the patient.      The patient was released in good condition.  She was given prescription for Keflex.  She was instructed to follow-up with her primary care physician and/or OB/GYN within 1 to 2 days for further management of her current symptoms and review of the urine culture results and repeat check of her blood pressure.  She will also follow-up with her OB/GYN within 48 hours for repeat quantitative hCG measurement and further management of the chorionic hemorrhages seen on pelvic ultrasound.  She will return to the emergency department sooner with worsening of symptoms or onset of new symptoms          Impression/diagnosis:  Threatened miscarriage  Acute lower urinary tract infection  Anemia, unspecified  Hypertension, unspecified  Fibroid uterus  Subchorionic hemorrhage      I personally saw the patient and made/approve the management plan and take responsibility for the patient management.      I independently interpreted the following study (S) diagnostic labs      I personally discussed the patient's management with the patient      I reviewed the results of the diagnostic labs and diagnostic imaging.  Formal  radiology read was completed by the radiologist.      Radha Choi MD

## 2025-06-02 NOTE — ED PROVIDER NOTES
HPI   Chief Complaint   Patient presents with    Abdominal Cramping     I'm 9 weeks pregnant and I have lower abd cramping with a brown discharge this started today        HPI  Patient is a 35-year-old G5, P3 female presenting for evaluation of abdominal cramping that started this morning.  Patient states that she is currently 9 weeks pregnant.  She states yesterday she had a headache and this morning she woke up and noticed some brown discharge on the toilet paper when she wiped this morning.  She states she did have some mild lower abdominal cramping also associated with the discharge.  She states this did happen last year and she had a miscarriage a few days later that she was concerned and wanted to be evaluated in the ER.  She does follow with OB gynecologist Dr. Uribe out of [place].  States her pregnancy thus far has been uncomplicated.  She does still endorse some mild lower abdominal cramping but otherwise has no acute complaints at this time.  States that she did have vaginal deliveries for her other 3 pregnancies that were uncomplicated.  Her last menstrual period was March 31.  She denies any urinary symptoms.  Denies any nausea vomiting diarrhea fevers chills chest pain or shortness of breath.      Patient History   Medical History[1]  Surgical History[2]  Family History[3]  Social History[4]    Physical Exam   ED Triage Vitals [06/02/25 0827]   Temperature Heart Rate Respirations BP   36.7 °C (98 °F) 80 18 150/72      Pulse Ox Temp Source Heart Rate Source Patient Position   97 % Temporal Monitor Sitting      BP Location FiO2 (%)     Left arm --       Physical Exam  Vitals and nursing note reviewed.   Constitutional:       General: She is not in acute distress.     Appearance: She is well-developed.      Comments: Well-appearing 35-year-old female resting in hospital chair   HENT:      Head: Normocephalic and atraumatic.   Eyes:      Conjunctiva/sclera: Conjunctivae normal.    Cardiovascular:      Rate and Rhythm: Normal rate and regular rhythm.      Heart sounds: No murmur heard.  Pulmonary:      Effort: Pulmonary effort is normal. No respiratory distress.      Breath sounds: Normal breath sounds.   Abdominal:      Palpations: Abdomen is soft.      Tenderness: There is no abdominal tenderness.   Musculoskeletal:         General: No swelling.      Cervical back: Neck supple.   Skin:     General: Skin is warm and dry.      Capillary Refill: Capillary refill takes less than 2 seconds.   Neurological:      Mental Status: She is alert.   Psychiatric:         Mood and Affect: Mood normal.           ED Course & MDM   Diagnoses as of 06/03/25 1330   Threatened miscarriage ([facility])   Acute lower urinary tract infection   Hypertension, unspecified type   Anemia, unspecified type   Lower abdominal pain   Uterine leiomyoma, unspecified location   Subchorionic hemorrhage of placenta in first trimester ([facility])                 No data recorded     [place] Coma Scale Score: 15 (06/02/25 0824 : Brennon Calero, EMT)                           Medical Decision Making  Parts of this chart have been completed using voice recognition software. Please excuse any errors of transcription.  My thought process and reason for plan has been formulated from the time that I saw the patient until the time of disposition and is not specific to one specific moment during their visit and furthermore my MDM encompasses this entire chart and not only this text box.      HPI: Detailed above.    Exam: A medically appropriate exam performed, outlined above, given the known history and presentation.    History obtained from: Patient    Medications given during visit:  Medications   sodium chloride 0.9 % bolus 1,000 mL (0 mL intravenous Stopped 6/2/25 8251)        Diagnostic/tests  Labs Reviewed   CBC WITH AUTO DIFFERENTIAL - Abnormal       Result Value    WBC 6.9      nRBC 0.0      RBC 4.53      Hemoglobin 11.9 (*)      Hematocrit 36.7      MCV 81      MCH 26.3      MCHC 32.4      RDW 14.8 (*)     Platelets 249      Neutrophils % 61.2      Immature Granulocytes %, Automated 0.1      Lymphocytes % 31.7      Monocytes % 5.6      Eosinophils % 1.0      Basophils % 0.4      Neutrophils Absolute 4.24      Immature Granulocytes Absolute, Automated 0.01      Lymphocytes Absolute 2.20      Monocytes Absolute 0.39      Eosinophils Absolute 0.07      Basophils Absolute 0.03     HUMAN CHORIONIC GONADOTROPIN, SERUM QUANTITATIVE - Abnormal    HCG, Beta-Quantitative 70,568 (*)     Narrative:      Total HCG measurement is performed using the [employer] Access   Immunoassay which detects intact HCG and free beta HCG subunit.    This test is not indicated for use as a tumor marker.   HCG testing is performed using a different test methodology at [facility] than other [facility]. Direct result comparison   should only be made within the same method.       URINALYSIS WITH REFLEX CULTURE AND MICROSCOPIC - Abnormal    Color, Urine Yellow      Appearance, Urine Turbid (*)     Specific Gravity, Urine 1.025      pH, Urine 7.0      Protein, Urine 10 (TRACE)      Glucose, Urine Normal      Blood, Urine 0.2 (2+) (*)     Ketones, Urine NEGATIVE      Bilirubin, Urine NEGATIVE      Urobilinogen, Urine Normal      Nitrite, Urine NEGATIVE      Leukocyte Esterase, Urine 500 Pat/uL (*)    MICROSCOPIC ONLY, URINE - Abnormal    WBC, Urine 11-20 (*)     RBC, Urine 1-2      Squamous Epithelial Cells, Urine 10-25 (FEW)      Bacteria, Urine 3+ (*)     Amorphous Crystals, Urine 4+ (*)    COMPREHENSIVE METABOLIC PANEL - Normal    Glucose 94      Sodium 136      Potassium 3.7      Chloride 106      Bicarbonate 21      Anion Gap 13      Urea Nitrogen 10      Creatinine 0.73      eGFR >90      Calcium 8.8      Albumin 4.1      Alkaline Phosphatase 40      Total Protein 7.0      AST 16      Bilirubin, Total 0.3      ALT 25     URINE CULTURE   ABORH     ABO TYPE A      Rh TYPE POS     URINALYSIS WITH REFLEX CULTURE AND MICROSCOPIC    Narrative:     The following orders were created for panel order Urinalysis with Reflex Culture and Microscopic.  Procedure                               Abnormality         Status                     ---------                               -----------         ------                     Urinalysis with Reflex C...[[id number]]  Abnormal            Final result               Extra Urine Gray Tube[[id number]]                                                         Please view results for these tests on the individual orders.   EXTRA URINE GRAY TUBE      US OB LESS THAN 14 WEEKS EARLY   Final Result   1. Single viable intrauterine pregnancy. Estimated sonographic   gestational age 9 weeks 3 days. Fetal heart rate 169 beats per   minute. Estimated delivery date 2026.   2. Gestational sac is lower in the uterine endometrium. This appears   to be slightly lower than on the prior examination.   3. There are 2 suspected subchorionic hemorrhages. These were not   identified in the prior study.   4. Vague hypoechoic ovoid focus in the fundus of the uterus measures   27 x 19 x 32 mm possible fibroid.        MACRO:   None        Signed by: Lefty Green 2025 10:40 AM   Dictation workstation:   [id number]           Considerations/further MDM:  Patient is a 35-year-old female presenting for evaluation of abdominal cramping, discharge, 9 weeks pregnant    I saw this patient in conjunction with Dr. Choi    Patient arrives awake alert nontoxic-appearing during the visit.  Her abdomen is soft, nontender on exam.  She has no active hemorrhaging during the visit.  Consider differentials including ectopic pregnancy, threatened , miscarriage, physiologic cramping and spotting in the setting of intrauterine pregnancy.  Laboratory workup with mild anemia that is stable compared to patient's baseline.  hCG is elevated at 70,568.   Urinalysis is felt to be suggestive of asymptomatic bacteriuria in pregnancy, patient without symptoms of acute urinary tract infection but will treat given urinalysis findings.  Ultrasound was performed with evidence of a single viable intrauterine pregnancy.  She does have evidence of 2 suspected subchorionic hemorrhages and also a possible fibroid.  She is otherwise hemodynamically stable and well-appearing at this time.  She is advised to follow-up with her OB gynecologist within the next 1 to 2 days for reevaluation of her symptoms.  Advised to follow-up regarding her hCG levels and also continue monitoring her symptoms including her bleeding closely.  Return precautions discussed.  I discussed the laboratory and imaging findings with the patient at bedside. Patient's questions and concerns were addressed. Patient was released in good condition, discharged with instructions to follow up with primary care provider and appropriate specialist, and to return to ED at any time for worsening symptoms or any other concerns. Patient demonstrates understanding of the findings and the importance of appropriate follow up care.             Procedure  Procedures       [1]   Past Medical History:  Diagnosis Date    GERD (gastroesophageal reflux disease)     Headache     Missed ab ([facility]) 2024   [2]   Past Surgical History:  Procedure Laterality Date    DILATION AND CURETTAGE OF UTERUS  03/29/2024   [3]   Family History  Problem Relation Name Age of Onset    Diabetes Mother pat     Rheum arthritis Mother pat     Arthritis Mother pat     Hypertension Mother pat     Miscarriages / Stillbirths Mother pat     Diabetes Father mohini     Stroke Father mohini     Hypertension Father mohini     Mental illness Brother mohini     Diabetes Mother's Sister Tierra    [4]   Social History  Tobacco Use    Smoking status: Never     Passive exposure: Never    Smokeless tobacco: Never   Vaping Use    Vaping status: Never Used   Substance Use  Topics    Alcohol use: Not Currently    Drug use: Never        Cecy Gentile PA-C  06/03/25 9978

## 2025-06-02 NOTE — Clinical Note
Hugo Kauffman was seen and treated in our emergency department on 6/2/2025.  She may return to work on 06/03/2025.       If you have any questions or concerns, please don't hesitate to call.      Radha Choi MD

## 2025-06-03 LAB — HOLD SPECIMEN: NORMAL

## 2025-06-04 LAB
BACTERIA UR CULT: ABNORMAL
BACTERIA UR CULT: ABNORMAL

## 2025-06-06 ENCOUNTER — TELEPHONE (OUTPATIENT)
Dept: PHARMACY | Facility: HOSPITAL | Age: 35
End: 2025-06-06
Payer: COMMERCIAL

## 2025-06-06 DIAGNOSIS — O99.891 ASYMPTOMATIC BACTERIURIA IN PREGNANCY (HHS-HCC): Primary | ICD-10-CM

## 2025-06-06 DIAGNOSIS — R82.71 ASYMPTOMATIC BACTERIURIA IN PREGNANCY (HHS-HCC): Primary | ICD-10-CM

## 2025-06-06 RX ORDER — CEPHALEXIN 500 MG/1
500 CAPSULE ORAL 4 TIMES DAILY
Qty: 14 CAPSULE | Refills: 0 | Status: SHIPPED | OUTPATIENT
Start: 2025-06-06

## 2025-06-06 NOTE — TELEPHONE ENCOUNTER
EDPD Note: Dose Change    Contacted Hugo Kauffman regarding positive urine culture/result that was taken during their recent emergency room visit. I completed education with patient. The patient is being treated with the proper medication; however, the dose of the discharge prescription is incorrect . I sent in a new prescription with  the new medication dose found below. No further follow up needed from EDPD Team.     Patient endorsed abdominal cramping in ER. Patient is pregnant. Patient will follow up with OBGYN regarding HcG levels. Patient discharged with Keflex 500mg BID x7 (14) days for asymptomatic UTI in pregnancy.   Patient is agreeable to increase how she is taking medication to four times daily and will  new Rx. Patient denies any nausea or diarrhea with antibiotic.    Discharged with: keflex 500 mg BID for 7 days  Drug: keflex 500 mg  Si cap PO QID  Days Supply: 7    Susceptibility data from last 90 days.  Collected Specimen Info Organism   25 Urine from Clean Catch/Voided Streptococcus agalactiae (Group B Streptococcus)       If there are any other questions for the ED Post-Discharge Culture Follow Up Team, please contact [phone]. Fax: [phone].    Vanessa Chavarria, PharmD

## 2025-06-10 ENCOUNTER — APPOINTMENT (OUTPATIENT)
Dept: OBSTETRICS AND GYNECOLOGY | Facility: CLINIC | Age: 35
End: 2025-06-10
Payer: COMMERCIAL

## 2025-06-10 ENCOUNTER — HOSPITAL ENCOUNTER (OUTPATIENT)
Dept: RADIOLOGY | Facility: CLINIC | Age: 35
Discharge: HOME | End: 2025-06-10
Payer: COMMERCIAL

## 2025-06-10 VITALS — DIASTOLIC BLOOD PRESSURE: 64 MMHG | BODY MASS INDEX: 40.74 KG/M2 | SYSTOLIC BLOOD PRESSURE: 102 MMHG | WEIGHT: 230 LBS

## 2025-06-10 DIAGNOSIS — Z34.81 ENCOUNTER FOR SUPERVISION OF NORMAL PREGNANCY IN MULTIGRAVIDA IN FIRST TRIMESTER: ICD-10-CM

## 2025-06-10 DIAGNOSIS — O23.591: ICD-10-CM

## 2025-06-10 DIAGNOSIS — Z3A.10 10 WEEKS GESTATION OF PREGNANCY (HHS-HCC): ICD-10-CM

## 2025-06-10 PROBLEM — O02.1 MISSED AB (HHS-HCC): Status: RESOLVED | Noted: 2024-03-29 | Resolved: 2025-06-10

## 2025-06-10 PROBLEM — D21.9 FIBROID: Status: ACTIVE | Noted: 2025-06-10

## 2025-06-10 PROCEDURE — 76801 OB US < 14 WKS SINGLE FETUS: CPT | Performed by: OBSTETRICS & GYNECOLOGY

## 2025-06-10 PROCEDURE — 99213 OFFICE O/P EST LOW 20 MIN: CPT | Performed by: OBSTETRICS & GYNECOLOGY

## 2025-06-10 RX ORDER — TERCONAZOLE 8 MG/G
CREAM VAGINAL
Qty: 20 G | Refills: 0 | Status: SHIPPED | OUTPATIENT
Start: 2025-06-10

## 2025-06-10 NOTE — PROGRESS NOTES
ER follow up    Feels better, stopped taking antibiotic because of hives (2 pills left)    C/o  thinks yeast infection, itch and irritation from antibiotic    10 weeks.  Was in the emergency room for bleeding.  Small subchorionic bleed noted.  Explained and discussed.    Has vulvar itching after utilizing Keflex.  Exam consistent with yeast vulvitis.  Vaginal Gram stain sent.  Will prescribe Terazol 3 and check follow-up ultrasound for continued assurance of viability.

## 2025-06-11 LAB — BV SCORE VAG QL: ABNORMAL

## 2025-06-12 DIAGNOSIS — N76.0 BV (BACTERIAL VAGINOSIS): Primary | ICD-10-CM

## 2025-06-12 DIAGNOSIS — B96.89 BV (BACTERIAL VAGINOSIS): Primary | ICD-10-CM

## 2025-06-12 RX ORDER — METRONIDAZOLE 7.5 MG/G
GEL VAGINAL
Qty: 70 G | Refills: 0 | Status: SHIPPED | OUTPATIENT
Start: 2025-06-12

## 2025-06-24 ENCOUNTER — APPOINTMENT (OUTPATIENT)
Dept: OBSTETRICS AND GYNECOLOGY | Facility: CLINIC | Age: 35
End: 2025-06-24
Payer: COMMERCIAL

## 2025-06-24 ENCOUNTER — APPOINTMENT (OUTPATIENT)
Dept: RADIOLOGY | Facility: CLINIC | Age: 35
End: 2025-06-24
Payer: COMMERCIAL

## 2025-06-24 VITALS — WEIGHT: 228 LBS | SYSTOLIC BLOOD PRESSURE: 102 MMHG | BODY MASS INDEX: 40.39 KG/M2 | DIASTOLIC BLOOD PRESSURE: 62 MMHG

## 2025-06-24 DIAGNOSIS — Z34.81 ENCOUNTER FOR SUPERVISION OF NORMAL PREGNANCY IN MULTIGRAVIDA IN FIRST TRIMESTER: ICD-10-CM

## 2025-06-24 DIAGNOSIS — Z3A.12 12 WEEKS GESTATION OF PREGNANCY (HHS-HCC): ICD-10-CM

## 2025-06-24 DIAGNOSIS — O09.521 AMA (ADVANCED MATERNAL AGE) MULTIGRAVIDA 35+, FIRST TRIMESTER (HHS-HCC): ICD-10-CM

## 2025-06-24 DIAGNOSIS — Z36.82 NUCHAL TRANSLUCENCY OF FETUS ON PRENATAL ULTRASOUND (HHS-HCC): ICD-10-CM

## 2025-06-24 DIAGNOSIS — O09.521 AMA (ADVANCED MATERNAL AGE) MULTIGRAVIDA 35+, FIRST TRIMESTER (HHS-HCC): Primary | ICD-10-CM

## 2025-06-24 PROCEDURE — 99213 OFFICE O/P EST LOW 20 MIN: CPT | Performed by: OBSTETRICS & GYNECOLOGY

## 2025-06-24 NOTE — PROGRESS NOTES
Feels well.      Ob Visit  25     SUBJECTIVE      HPI: Hugo Kauffman is a 35 y.o.  at 12w1d here for RPNV.       OBJECTIVE  Visit Vitals  /62   Wt 103 kg (228 lb)   LMP 2025   BMI 40.39 kg/m²   OB Status Pregnant   Smoking Status Never   BSA 2.14 m²            ASSESSMENT & PLAN    Hugo Kauffman is a 35 y.o.  at 12w1d here for the following concerns we addressed today:    Problem List Items Addressed This Visit          Ob-Gyn Problems    12 weeks gestation of pregnancy ([facility])    Overview   Desired provider in labor: [x] CNM  [x] Physician   [x] Either Acceptable  [x] Blood Products: [x] Yes, accepts [] No, needs counseling  [x] Initial BMI: 39.58   [x] Prenatal Labs: 25  [x] Cervical Cancer Screening up to date 3/24 WNL  [x] Rh status: POS  [x] Screen for IPV and Substance Use Risk:  [x] Genetic Screening (cfDNA):  25  [x] First Trimester Anatomy Screen (11-13.6 wks): 25, NT 1.7mm  [] Baby ASA (initiated):  [x] Pregnancy dated by:  LMP    [] Anatomy US: (19-20 wks)  [] Federal Sterilization consent signed (if indicated):  [] 1hr GCT at 24-28wks:  [] Rhogam (if indicated):   [] Fetal Surveillance (if indicated):  [] Tdap (27-32 wks, may be given up to 36 wks if initial window missed):   [] RSV (32-36 wks) (Sept. to end of ):     [] Feeding Intentions:  [] Postpartum Birth control method:   [] GBS at 36 - 37 wks:  [] 39 weeks discussion of IOL vs. Expectant management:  [x] Mode of delivery ( anticipated ): V         Relevant Orders    [facility] Prenatal Screen    AMA (advanced maternal age) multigravida 35+, first trimester ([facility]) - Primary     Other Visit Diagnoses         Encounter for supervision of normal pregnancy in multigravida in first trimester        Relevant Orders    [facility] Prequel Prenatal Screen              RTC in 4 weeks      Gabriel Uribe MD

## 2025-07-15 LAB — COMMENTS - MP RESULT TYPE: NORMAL

## 2025-07-22 ENCOUNTER — APPOINTMENT (OUTPATIENT)
Dept: OBSTETRICS AND GYNECOLOGY | Facility: CLINIC | Age: 35
End: 2025-07-22
Payer: COMMERCIAL

## 2025-07-22 VITALS — SYSTOLIC BLOOD PRESSURE: 122 MMHG | WEIGHT: 227 LBS | BODY MASS INDEX: 40.21 KG/M2 | DIASTOLIC BLOOD PRESSURE: 68 MMHG

## 2025-07-22 DIAGNOSIS — Z3A.16 16 WEEKS GESTATION OF PREGNANCY (HHS-HCC): ICD-10-CM

## 2025-07-22 DIAGNOSIS — O09.522 MULTIGRAVIDA OF ADVANCED MATERNAL AGE IN SECOND TRIMESTER (HHS-HCC): ICD-10-CM

## 2025-07-22 DIAGNOSIS — Z34.82 ENCOUNTER FOR SUPERVISION OF NORMAL PREGNANCY IN MULTIGRAVIDA IN SECOND TRIMESTER: ICD-10-CM

## 2025-07-22 DIAGNOSIS — Z36.3 SCREENING, ANTENATAL, FOR MALFORMATION BY ULTRASOUND: ICD-10-CM

## 2025-07-22 PROCEDURE — 99213 OFFICE O/P EST LOW 20 MIN: CPT | Performed by: OBSTETRICS & GYNECOLOGY

## 2025-07-22 NOTE — PROGRESS NOTES
Asks about weight loss.  Eating daily and nutrition improved w pregnancy, following BS's which have been normal    Ob Visit  25     SUBJECTIVE      HPI: Hugo Kauffman is a 35 y.o.  at 16w1d here for RPNV.       OBJECTIVE  Visit Vitals  /68   Wt 103 kg (227 lb)   LMP 2025   BMI 40.21 kg/m²   OB Status Pregnant   Smoking Status Never   BSA 2.14 m²            ASSESSMENT & PLAN    Hugo Kauffman is a 35 y.o.  at 16w1d here for the following concerns we addressed today:    Problem List Items Addressed This Visit          Ob-Gyn Problems    16 weeks gestation of pregnancy ([facility])    Overview   Desired provider in labor: [x] CNM  [x] Physician   [x] Either Acceptable  [x] Blood Products: [x] Yes, accepts [] No, needs counseling  [x] Initial BMI: 39.58   [x] Prenatal Labs: 25  [x] Cervical Cancer Screening up to date 3/24 WNL  [x] Rh status: POS  [x] Screen for IPV and Substance Use Risk:  [x] Genetic Screening (cfDNA):  25  [x] First Trimester Anatomy Screen (11-13.6 wks): 25, NT 1.7mm  [] Baby ASA (initiated):  [x] Pregnancy dated by:  LMP    [] Anatomy US: (19-20 wks)  [] Federal Sterilization consent signed (if indicated):  [] 1hr GCT at 24-28wks:  [] Rhogam (if indicated):   [] Fetal Surveillance (if indicated):  [] Tdap (27-32 wks, may be given up to 36 wks if initial window missed):   [] RSV (32-36 wks) (Sept. to end of ):     [] Feeding Intentions:  [] Postpartum Birth control method:   [] GBS at 36 - 37 wks:  [] 39 weeks discussion of IOL vs. Expectant management:  [x] Mode of delivery ( anticipated ): V          Other Visit Diagnoses         Encounter for supervision of normal pregnancy in multigravida in second trimester        Relevant Medications    prenatal vitamin, iron-folic, 27 mg iron-800 mcg folic acid tablet      Multigravida of advanced maternal age in second trimester ([facility])        Relevant Orders    US OB 14+ weeks anatomy scan      Screening,  , for malformation by ultrasound        Relevant Orders    US OB 14+ weeks anatomy scan              RTC in 4 weeks      Gabriel Uribe MD

## 2025-08-12 ENCOUNTER — APPOINTMENT (OUTPATIENT)
Dept: RHEUMATOLOGY | Facility: CLINIC | Age: 35
End: 2025-08-12
Payer: COMMERCIAL

## 2025-08-12 VITALS
HEIGHT: 63 IN | OXYGEN SATURATION: 98 % | WEIGHT: 225 LBS | BODY MASS INDEX: 39.87 KG/M2 | SYSTOLIC BLOOD PRESSURE: 110 MMHG | HEART RATE: 83 BPM | DIASTOLIC BLOOD PRESSURE: 69 MMHG

## 2025-08-12 DIAGNOSIS — R76.8 ANA POSITIVE: Primary | ICD-10-CM

## 2025-08-12 PROCEDURE — 99203 OFFICE O/P NEW LOW 30 MIN: CPT | Performed by: INTERNAL MEDICINE

## 2025-08-12 PROCEDURE — 3008F BODY MASS INDEX DOCD: CPT | Performed by: INTERNAL MEDICINE

## 2025-08-12 ASSESSMENT — RHEUMATOLOGY NEW PATIENT QUESTIONNAIRE
SWOLLEN LEGS OR FEET: N
BLOOD IN STOOLS: N
PERSISTENT DIARRHEA: N
VOMITING OF BLOOD OR COFFEE GROUND CONSISTENCY MATERIAL: N
CHEST PAIN: N
NAUSEA: N
STOMACH PAIN: N
UNUSUALLY RAPID OR SLOWED HEART RATE: N
HOARSE VOICE: N
UNEXPLAINED HEARING LOSS: N
DIFFICULTY BREATHING LYING DOWN: N
DIFFICULTY SWALLOWING: N
SORES IN MOUTH OR NOSE: N
JAUNDICE: N
DRYNESS OF MOUTH: N
COUGH: N
UNEXPLAINED WEIGHT CHANGE: Y
BLACK STOOLS: N
SHORTNESS OF BREATH: N
HEARTBURN OR REFLUX: N

## 2025-08-12 ASSESSMENT — PAIN SCALES - GENERAL: PAINLEVEL_OUTOF10: 0-NO PAIN

## 2025-08-12 ASSESSMENT — ENCOUNTER SYMPTOMS
FATIGUE: 1
SHORTNESS OF BREATH: 0
SLEEP DISTURBANCE: 1
HEADACHES: 0
ABDOMINAL DISTENTION: 0
NUMBNESS: 0

## 2025-08-16 LAB
B2 GLYCOPROT1 IGA SER-ACNC: <2 U/ML
B2 GLYCOPROT1 IGG SER-ACNC: <2 U/ML
B2 GLYCOPROT1 IGM SER-ACNC: 4.9 U/ML
C3 SERPL-MCNC: 198 MG/DL (ref 83–193)
C4 SERPL-MCNC: 38 MG/DL (ref 15–57)
CARDIOLIPIN IGA SER IA-ACNC: <2 APL-U/ML
CARDIOLIPIN IGG SER IA-ACNC: <2 GPL-U/ML
CARDIOLIPIN IGM SER IA-ACNC: 4.3 MPL-U/ML
CCP IGG SERPL-ACNC: <16 UNITS
CENTROMERE B AB SER-ACNC: NORMAL AI
CONFIRM APTT STACLOT: NEGATIVE
DSDNA AB SER-ACNC: <1 IU/ML
ENA JO1 AB SER IA-ACNC: NORMAL AI
ENA RNP AB SER-ACNC: NORMAL AI
ENA SCL70 AB SER IA-ACNC: NORMAL AI
ENA SM AB SER IA-ACNC: NORMAL AI
ENA SM+RNP AB SER IA-ACNC: NORMAL AI
ENA SS-A AB SER IA-ACNC: NORMAL AI
ENA SS-B AB SER IA-ACNC: NORMAL AI
LA 2 SCREEN W REFLEX-IMP: ABNORMAL
NUCLEOSOME AB SER IA-ACNC: NORMAL AI
RIBOSOMAL P AB SER-ACNC: NORMAL AI
SCREEN APTT: 41 SEC
SCREEN DRVVT: 39 SEC
THYROPEROXIDASE AB SERPL-ACNC: <1 IU/ML

## 2025-08-19 ENCOUNTER — APPOINTMENT (OUTPATIENT)
Dept: OBSTETRICS AND GYNECOLOGY | Facility: CLINIC | Age: 35
End: 2025-08-19
Payer: COMMERCIAL

## 2025-08-19 ENCOUNTER — APPOINTMENT (OUTPATIENT)
Dept: RADIOLOGY | Facility: CLINIC | Age: 35
End: 2025-08-19
Payer: COMMERCIAL

## 2025-08-19 VITALS — BODY MASS INDEX: 40.21 KG/M2 | SYSTOLIC BLOOD PRESSURE: 102 MMHG | DIASTOLIC BLOOD PRESSURE: 60 MMHG | WEIGHT: 227 LBS

## 2025-08-19 DIAGNOSIS — Z34.82 ENCOUNTER FOR SUPERVISION OF NORMAL PREGNANCY IN MULTIGRAVIDA IN SECOND TRIMESTER: ICD-10-CM

## 2025-08-19 DIAGNOSIS — Z3A.20 20 WEEKS GESTATION OF PREGNANCY (HHS-HCC): ICD-10-CM

## 2025-08-19 DIAGNOSIS — Z36.3 SCREENING, ANTENATAL, FOR MALFORMATION BY ULTRASOUND: ICD-10-CM

## 2025-08-19 DIAGNOSIS — O09.522 MULTIGRAVIDA OF ADVANCED MATERNAL AGE IN SECOND TRIMESTER (HHS-HCC): ICD-10-CM

## 2025-08-19 PROCEDURE — 99213 OFFICE O/P EST LOW 20 MIN: CPT | Performed by: OBSTETRICS & GYNECOLOGY

## 2025-08-20 ENCOUNTER — RESULTS FOLLOW-UP (OUTPATIENT)
Dept: RHEUMATOLOGY | Facility: CLINIC | Age: 35
End: 2025-08-20
Payer: COMMERCIAL

## 2025-09-16 ENCOUNTER — APPOINTMENT (OUTPATIENT)
Dept: OBSTETRICS AND GYNECOLOGY | Facility: CLINIC | Age: 35
End: 2025-09-16
Payer: COMMERCIAL